# Patient Record
Sex: FEMALE | Race: WHITE | NOT HISPANIC OR LATINO | Employment: OTHER | URBAN - METROPOLITAN AREA
[De-identification: names, ages, dates, MRNs, and addresses within clinical notes are randomized per-mention and may not be internally consistent; named-entity substitution may affect disease eponyms.]

---

## 2017-10-02 ENCOUNTER — HISTORICAL (OUTPATIENT)
Dept: RADIOLOGY | Facility: HOSPITAL | Age: 82
End: 2017-10-02

## 2018-10-29 ENCOUNTER — HISTORICAL (OUTPATIENT)
Dept: RADIOLOGY | Facility: HOSPITAL | Age: 83
End: 2018-10-29

## 2019-10-31 ENCOUNTER — HISTORICAL (OUTPATIENT)
Dept: RADIOLOGY | Facility: HOSPITAL | Age: 84
End: 2019-10-31

## 2021-09-16 ENCOUNTER — HISTORICAL (OUTPATIENT)
Dept: RADIOLOGY | Facility: HOSPITAL | Age: 86
End: 2021-09-16

## 2022-04-10 ENCOUNTER — HISTORICAL (OUTPATIENT)
Dept: ADMINISTRATIVE | Facility: HOSPITAL | Age: 87
End: 2022-04-10

## 2022-04-27 VITALS
HEIGHT: 62 IN | SYSTOLIC BLOOD PRESSURE: 108 MMHG | BODY MASS INDEX: 22.08 KG/M2 | OXYGEN SATURATION: 96 % | DIASTOLIC BLOOD PRESSURE: 68 MMHG | WEIGHT: 120 LBS

## 2023-01-08 ENCOUNTER — HOSPITAL ENCOUNTER (EMERGENCY)
Facility: HOSPITAL | Age: 88
Discharge: HOME | End: 2023-01-08
Attending: INTERNAL MEDICINE
Payer: MEDICARE

## 2023-01-08 VITALS
RESPIRATION RATE: 18 BRPM | HEIGHT: 63 IN | HEART RATE: 73 BPM | OXYGEN SATURATION: 99 % | SYSTOLIC BLOOD PRESSURE: 166 MMHG | WEIGHT: 130 LBS | DIASTOLIC BLOOD PRESSURE: 90 MMHG | BODY MASS INDEX: 23.04 KG/M2

## 2023-01-08 DIAGNOSIS — R53.1 WEAKNESS: ICD-10-CM

## 2023-01-08 DIAGNOSIS — G93.31 POST VIRAL SYNDROME: Primary | ICD-10-CM

## 2023-01-08 LAB
ALBUMIN SERPL-MCNC: 3.2 G/DL (ref 3.4–4.8)
ALBUMIN/GLOB SERPL: 0.9 RATIO (ref 1.1–2)
ALP SERPL-CCNC: 48 UNIT/L (ref 40–150)
ALT SERPL-CCNC: 16 UNIT/L (ref 0–55)
APPEARANCE UR: CLEAR
AST SERPL-CCNC: 26 UNIT/L (ref 5–34)
BASOPHILS # BLD AUTO: 0.02 X10(3)/MCL (ref 0–0.2)
BASOPHILS NFR BLD AUTO: 0.6 %
BILIRUB UR QL STRIP.AUTO: NEGATIVE MG/DL
BILIRUBIN DIRECT+TOT PNL SERPL-MCNC: 0.3 MG/DL
BNP BLD-MCNC: 54.7 PG/ML
BUN SERPL-MCNC: 29 MG/DL (ref 9.8–20.1)
CALCIUM SERPL-MCNC: 10 MG/DL (ref 8.4–10.2)
CHLORIDE SERPL-SCNC: 105 MMOL/L (ref 98–107)
CO2 SERPL-SCNC: 23 MMOL/L (ref 23–31)
COLOR UR AUTO: YELLOW
CREAT SERPL-MCNC: 0.73 MG/DL (ref 0.55–1.02)
EOSINOPHIL # BLD AUTO: 0.04 X10(3)/MCL (ref 0–0.9)
EOSINOPHIL NFR BLD AUTO: 1.2 %
ERYTHROCYTE [DISTWIDTH] IN BLOOD BY AUTOMATED COUNT: 13.1 % (ref 11–14.5)
GFR SERPLBLD CREATININE-BSD FMLA CKD-EPI: 79 MLS/MIN/1.73/M2
GLOBULIN SER-MCNC: 3.5 GM/DL (ref 2.4–3.5)
GLUCOSE SERPL-MCNC: 89 MG/DL (ref 82–115)
GLUCOSE UR QL STRIP.AUTO: NEGATIVE MG/DL
HCT VFR BLD AUTO: 40.5 % (ref 37–47)
HGB BLD-MCNC: 12.6 GM/DL (ref 12–16)
IMM GRANULOCYTES # BLD AUTO: 0.01 X10(3)/MCL (ref 0–0.04)
IMM GRANULOCYTES NFR BLD AUTO: 0.3 %
KETONES UR QL STRIP.AUTO: NEGATIVE MG/DL
LACTATE SERPL-SCNC: 1 MMOL/L (ref 0.5–2.2)
LEUKOCYTE ESTERASE UR QL STRIP.AUTO: NEGATIVE UNIT/L
LYMPHOCYTES # BLD AUTO: 1.08 X10(3)/MCL (ref 0.6–4.6)
LYMPHOCYTES NFR BLD AUTO: 31.6 %
MAGNESIUM SERPL-MCNC: 2.1 MG/DL (ref 1.6–2.6)
MCH RBC QN AUTO: 30.4 PG
MCHC RBC AUTO-ENTMCNC: 31.1 MG/DL (ref 33–36)
MCV RBC AUTO: 97.6 FL (ref 80–94)
MONOCYTES # BLD AUTO: 0.54 X10(3)/MCL (ref 0.1–1.3)
MONOCYTES NFR BLD AUTO: 15.8 %
NEUTROPHILS # BLD AUTO: 1.73 X10(3)/MCL (ref 2.1–9.2)
NEUTROPHILS NFR BLD AUTO: 50.5 %
NITRITE UR QL STRIP.AUTO: NEGATIVE
PH UR STRIP.AUTO: 7 [PH]
PLATELET # BLD AUTO: 217 X10(3)/MCL (ref 140–371)
PMV BLD AUTO: 10 FL (ref 9.4–12.4)
POTASSIUM SERPL-SCNC: 4.1 MMOL/L (ref 3.5–5.1)
PROT SERPL-MCNC: 6.7 GM/DL (ref 5.8–7.6)
PROT UR QL STRIP.AUTO: NEGATIVE MG/DL
RBC # BLD AUTO: 4.15 X10(6)/MCL (ref 4.2–5.4)
RBC UR QL AUTO: NEGATIVE UNIT/L
SODIUM SERPL-SCNC: 138 MMOL/L (ref 136–145)
SP GR UR STRIP.AUTO: 1.01
TROPONIN I SERPL-MCNC: <0.01 NG/ML (ref 0–0.04)
UROBILINOGEN UR STRIP-ACNC: 0.2 MG/DL
WBC # SPEC AUTO: 3.4 X10(3)/MCL (ref 4.5–11.5)

## 2023-01-08 PROCEDURE — 83735 ASSAY OF MAGNESIUM: CPT | Performed by: INTERNAL MEDICINE

## 2023-01-08 PROCEDURE — 99284 EMERGENCY DEPT VISIT MOD MDM: CPT

## 2023-01-08 PROCEDURE — 85025 COMPLETE CBC W/AUTO DIFF WBC: CPT | Performed by: INTERNAL MEDICINE

## 2023-01-08 PROCEDURE — 81003 URINALYSIS AUTO W/O SCOPE: CPT | Performed by: INTERNAL MEDICINE

## 2023-01-08 PROCEDURE — 80053 COMPREHEN METABOLIC PANEL: CPT | Performed by: INTERNAL MEDICINE

## 2023-01-08 PROCEDURE — 83605 ASSAY OF LACTIC ACID: CPT | Performed by: INTERNAL MEDICINE

## 2023-01-08 PROCEDURE — 84484 ASSAY OF TROPONIN QUANT: CPT | Performed by: INTERNAL MEDICINE

## 2023-01-08 PROCEDURE — 83880 ASSAY OF NATRIURETIC PEPTIDE: CPT | Performed by: INTERNAL MEDICINE

## 2023-01-08 PROCEDURE — 25000003 PHARM REV CODE 250: Performed by: INTERNAL MEDICINE

## 2023-01-08 RX ORDER — SODIUM CHLORIDE 9 MG/ML
125 INJECTION, SOLUTION INTRAVENOUS ONCE
Status: COMPLETED | OUTPATIENT
Start: 2023-01-08 | End: 2023-01-08

## 2023-01-08 RX ADMIN — SODIUM CHLORIDE 125 ML/HR: 9 INJECTION, SOLUTION INTRAVENOUS at 06:01

## 2023-01-09 NOTE — ED PROVIDER NOTES
Encounter Date: 1/8/2023       History     Chief Complaint   Patient presents with    Dizziness     Dizziness x1 week, pt from [facility].      89-year-old white female presents to the emergency department with 1 week of dizziness and weakness.  Patient states she was recently diagnosed with COVID and got over it and since then she is been very weak and has occasional dizzy spells.  Currently she states when he would to hurry up because she is hungry    Review of patient's allergies indicates:   Allergen Reactions    Sulfa (sulfonamide antibiotics)      Past Medical History:   Diagnosis Date    Arthritis     Depression     GERD (gastroesophageal reflux disease)     Thyroid disease      History reviewed. No pertinent surgical history.  No family history on file.  Social History     Tobacco Use    Smoking status: Never    Smokeless tobacco: Never   Substance Use Topics    Alcohol use: Never    Drug use: Never     Review of Systems   Constitutional: Negative.  Negative for activity change, appetite change, chills, diaphoresis, fatigue, fever and unexpected weight change.   HENT: Negative.  Negative for congestion, dental problem, drooling, ear discharge, ear pain, facial swelling, hearing loss, mouth sores, nosebleeds, postnasal drip, rhinorrhea, sinus pressure, sinus pain, sneezing, sore throat, tinnitus, trouble swallowing and voice change.    Eyes: Negative.  Negative for photophobia, pain, discharge, redness, itching and visual disturbance.   Respiratory: Negative.  Negative for apnea, cough, choking, chest tightness, shortness of breath, wheezing and stridor.    Cardiovascular: Negative.  Negative for chest pain, palpitations and leg swelling.   Gastrointestinal: Negative.  Negative for abdominal distention, abdominal pain, anal bleeding, blood in stool, constipation, diarrhea, nausea, rectal pain and vomiting.   Endocrine: Negative.  Negative for cold intolerance, heat intolerance, polydipsia, polyphagia and  polyuria.   Genitourinary: Negative.  Negative for decreased urine volume, difficulty urinating, dyspareunia, dysuria, enuresis, flank pain, frequency, genital sores, hematuria, menstrual problem, pelvic pain, urgency, vaginal bleeding, vaginal discharge and vaginal pain.   Musculoskeletal: Negative.  Negative for arthralgias, back pain, gait problem, joint swelling, myalgias, neck pain and neck stiffness.   Skin: Negative.  Negative for color change, pallor, rash and wound.   Allergic/Immunologic: Negative.  Negative for environmental allergies, food allergies and immunocompromised state.   Neurological:  Positive for dizziness, weakness and light-headedness. Negative for tremors, seizures, syncope, facial asymmetry, speech difficulty, numbness and headaches.   Hematological: Negative.  Negative for adenopathy. Does not bruise/bleed easily.   Psychiatric/Behavioral: Negative.  Negative for agitation, behavioral problems, confusion, decreased concentration, dysphoric mood, hallucinations, self-injury, sleep disturbance and suicidal ideas. The patient is not nervous/anxious and is not hyperactive.    All other systems reviewed and are negative.    Physical Exam     Initial Vitals [01/08/23 1728]   BP Pulse Resp Temp SpO2   (!) 166/90 73 18 -- 99 %      MAP       --         Physical Exam    Nursing note and vitals reviewed.  Constitutional: She appears well-developed and well-nourished. She is not diaphoretic. No distress.   HENT:   Head: Normocephalic and atraumatic.   Mouth/Throat: Oropharynx is clear and moist. No oropharyngeal exudate.   Eyes: Conjunctivae and EOM are normal. Pupils are equal, round, and reactive to light. No scleral icterus.   Neck: Neck supple. No JVD present.   Normal range of motion.  Cardiovascular:  Normal rate, regular rhythm, normal heart sounds and intact distal pulses.     Exam reveals no gallop and no friction rub.       No murmur heard.  Pulmonary/Chest: Breath sounds normal. No  respiratory distress. She has no wheezes. She exhibits no tenderness.   Abdominal: Abdomen is soft. Bowel sounds are normal. She exhibits no distension. There is no abdominal tenderness. There is no rebound.   Musculoskeletal:         General: Normal range of motion.      Cervical back: Normal range of motion and neck supple.     Neurological: She is alert and oriented to person, place, and time. She has normal strength.   Skin: Skin is warm and dry. Capillary refill takes less than 2 seconds.   Psychiatric: She has a normal mood and affect. Her behavior is normal. Judgment and thought content normal.       ED Course   Procedures  Admission on 01/08/2023   Component Date Value Ref Range Status    Sodium Level 01/08/2023 138  136 - 145 mmol/L Final    Potassium Level 01/08/2023 4.1  3.5 - 5.1 mmol/L Final    Chloride 01/08/2023 105  98 - 107 mmol/L Final    Carbon Dioxide 01/08/2023 23  23 - 31 mmol/L Final    Glucose Level 01/08/2023 89  82 - 115 mg/dL Final    Blood Urea Nitrogen 01/08/2023 29.0 (H)  9.8 - 20.1 mg/dL Final    Creatinine 01/08/2023 0.73  0.55 - 1.02 mg/dL Final    Calcium Level Total 01/08/2023 10.0  8.4 - 10.2 mg/dL Final    Protein Total 01/08/2023 6.7  5.8 - 7.6 gm/dL Final    Albumin Level 01/08/2023 3.2 (L)  3.4 - 4.8 g/dL Final    Globulin 01/08/2023 3.5  2.4 - 3.5 gm/dL Final    Albumin/Globulin Ratio 01/08/2023 0.9 (L)  1.1 - 2.0 ratio Final    Bilirubin Total 01/08/2023 0.3  <=1.5 mg/dL Final    Alkaline Phosphatase 01/08/2023 48  40 - 150 unit/L Final    Alanine Aminotransferase 01/08/2023 16  0 - 55 unit/L Final    Aspartate Aminotransferase 01/08/2023 26  5 - 34 unit/L Final    eGFR 01/08/2023 79  mls/min/1.73/m2 Final    Color, UA 01/08/2023 Yellow  Yellow, Light-Yellow, Dark Yellow, Enriqueta, Straw Final    Appearance, UA 01/08/2023 Clear  Clear Final    Specific Gravity, UA 01/08/2023 1.015   Final    pH, UA 01/08/2023 7.0  5.0 - 8.5 Final    Protein, UA 01/08/2023 Negative  Negative  mg/dL Final    Glucose, UA 01/08/2023 Negative  Negative, Normal mg/dL Final    Ketones, UA 01/08/2023 Negative  Negative mg/dL Final    Blood, UA 01/08/2023 Negative  Negative unit/L Final    Bilirubin, UA 01/08/2023 Negative  Negative mg/dL Final    Urobilinogen, UA 01/08/2023 0.2  0.2, 1.0, Normal mg/dL Final    Nitrites, UA 01/08/2023 Negative  Negative Final    Leukocyte Esterase, UA 01/08/2023 Negative  Negative unit/L Final    Lactic Acid Level 01/08/2023 1.0  0.5 - 2.2 mmol/L Final    Troponin-I 01/08/2023 <0.010  0.000 - 0.045 ng/mL Final    Natriuretic Peptide 01/08/2023 54.7  <=100.0 pg/mL Final    Magnesium Level 01/08/2023 2.10  1.60 - 2.60 mg/dL Final    WBC 01/08/2023 3.4 (L)  4.5 - 11.5 x10(3)/mcL Final    RBC 01/08/2023 4.15 (L)  4.20 - 5.40 x10(6)/mcL Final    Hgb 01/08/2023 12.6  12.0 - 16.0 gm/dL Final    Hct 01/08/2023 40.5  37.0 - 47.0 % Final    MCV 01/08/2023 97.6 (H)  80.0 - 94.0 fL Final    MCH 01/08/2023 30.4  pg Final    MCHC 01/08/2023 31.1 (L)  33.0 - 36.0 mg/dL Final    RDW 01/08/2023 13.1  11.0 - 14.5 % Final    Platelet 01/08/2023 217  140 - 371 x10(3)/mcL Final    MPV 01/08/2023 10.0  9.4 - 12.4 fL Final    Neut % 01/08/2023 50.5  % Final    Lymph % 01/08/2023 31.6  % Final    Mono % 01/08/2023 15.8  % Final    Eos % 01/08/2023 1.2  % Final    Basophil % 01/08/2023 0.6  % Final    Lymph # 01/08/2023 1.08  0.6 - 4.6 x10(3)/mcL Final    Neut # 01/08/2023 1.73 (L)  2.1 - 9.2 x10(3)/mcL Final    Mono # 01/08/2023 0.54  0.1 - 1.3 x10(3)/mcL Final    Eos # 01/08/2023 0.04  0 - 0.9 x10(3)/mcL Final    Baso # 01/08/2023 0.02  0 - 0.2 x10(3)/mcL Final    IG# 01/08/2023 0.01  0 - 0.04 x10(3)/mcL Final    IG% 01/08/2023 0.3  % Final       Labs Reviewed   COMPREHENSIVE METABOLIC PANEL - Abnormal; Notable for the following components:       Result Value    Blood Urea Nitrogen 29.0 (*)     Albumin Level 3.2 (*)     Albumin/Globulin Ratio 0.9 (*)     All other components within normal limits   CBC  WITH DIFFERENTIAL - Abnormal; Notable for the following components:    WBC 3.4 (*)     RBC 4.15 (*)     MCV 97.6 (*)     MCHC 31.1 (*)     Neut # 1.73 (*)     All other components within normal limits   LACTIC ACID, PLASMA - Normal   TROPONIN I - Normal   B-TYPE NATRIURETIC PEPTIDE - Normal   MAGNESIUM - Normal   CBC W/ AUTO DIFFERENTIAL    Narrative:     The following orders were created for panel order CBC auto differential.  Procedure                               Abnormality         Status                     ---------                               -----------         ------                     CBC with Differential[[id number]]        Abnormal            Final result                 Please view results for these tests on the individual orders.   URINALYSIS, REFLEX TO URINE CULTURE    Narrative:      URINE STABILITY IS 2 HOURS AT ROOM TEMP OR    SIX HOURS REFRIGERATED. PERFORMING TESTING ON    SPECIMENS GREATER THAN THIS AGE MAY AFFECT THE    FOLLOWING TESTS:    PH          SPECIFIC GRAVITY           BLOOD    CLARITY     BILIRUBIN               UROBILINOGEN          Imaging Results    None          Medications   0.9%  NaCl infusion (125 mL/hr Intravenous New Bag 1/8/23 8779)                       Medical Decision Making  [age] year old white female presents emergency department with 1 week of weakness and intermittent dizziness.  Patient denies dizziness currently but states that she just feels weak.  She was recently diagnosed with COVID and got over it and she states since then she continues to be weak.  We discussed that she apparently has post viral fatigue syndrome as a workup was done which shows a normal white blood cell count and H&H mild elevation in BUN and creatinine is normal and the rest of her blood work is normal and urine is clear.  Her blood pressure stable vitals are stable and she is nontoxic looking.  We discussed expectations and she can follow up with her primary care physician upon  discharge if she continues with his fatigue    Problems Addressed:  Post viral syndrome: self-limited or minor problem  Weakness: self-limited or minor problem    Amount and/or Complexity of Data Reviewed  Independent Historian: caregiver  External Data Reviewed: labs and notes.  Labs: ordered. Decision-making details documented in ED Course.    Risk  OTC drugs.  Decision regarding hospitalization.            Clinical Impression:   Final diagnoses:  [G93.31] Post viral syndrome (Primary)  [R53.1] Weakness        ED Disposition Condition    Discharge Stable          ED Prescriptions    None       Follow-up Information       Follow up With Specialties Details Why Contact Info    Virgil Mcknight MD Internal Medicine In 2 days  [facility] [facility] [address]  [facility] [address], [facility] [address]  [phone]            Cecelia De Jesus is a certified MA and was present during the entire interaction with this patient      Zachariah Spears MD  01/08/23 2014

## 2023-06-04 ENCOUNTER — HOSPITAL ENCOUNTER (EMERGENCY)
Facility: HOSPITAL | Age: 88
Discharge: HOME | End: 2023-06-04
Attending: FAMILY MEDICINE
Payer: MEDICARE

## 2023-06-04 VITALS
RESPIRATION RATE: 16 BRPM | DIASTOLIC BLOOD PRESSURE: 104 MMHG | BODY MASS INDEX: 23.03 KG/M2 | HEART RATE: 59 BPM | WEIGHT: 130 LBS | TEMPERATURE: 98 F | OXYGEN SATURATION: 100 % | SYSTOLIC BLOOD PRESSURE: 159 MMHG

## 2023-06-04 DIAGNOSIS — S70.12XA CONTUSION OF LEFT THIGH, INITIAL ENCOUNTER: Primary | ICD-10-CM

## 2023-06-04 DIAGNOSIS — S01.01XA LACERATION OF SKIN OF SCALP, INITIAL ENCOUNTER: ICD-10-CM

## 2023-06-04 PROCEDURE — 99285 EMERGENCY DEPT VISIT HI MDM: CPT | Mod: 25

## 2023-06-04 NOTE — ED PROVIDER NOTES
Encounter Date: 6/4/2023       History     Chief Complaint   Patient presents with    Fall     C/o tripped over husbands wheelchair and fell, denies LOC. Pt c/o pain to left ear and feeling tired.     Patient is an 89-year-old female from the assisted living brought in the emergency room by ambulance due to a fall.  Patient reports that she was walking this morning, and tripped over a friend wheelchair causing her to fall.  Patient landed on her left thigh, and also hit her left head.  No loss of consciousness.  No dizziness.  No nausea or vomiting.  Patient has a superficial abrasion to left forearm.  Due to the fall, EMS was called.  Patient was ambulatory on scene.  Patient reports simply tripping, feels in his normal state of health.  Has been eating and drinking well.  Patient denies chest pain shortness breath nausea vomiting or abdominal pain.    The history is provided by the patient.   Review of patient's allergies indicates:   Allergen Reactions    Sulfa (sulfonamide antibiotics)      Past Medical History:   Diagnosis Date    Anxiety disorder, unspecified     Arthritis     Depression     GERD (gastroesophageal reflux disease)     Hypertension     Thyroid disease      History reviewed. No pertinent surgical history.  History reviewed. No pertinent family history.  Social History     Tobacco Use    Smoking status: Never    Smokeless tobacco: Never   Substance Use Topics    Alcohol use: Never    Drug use: Never     Review of Systems   Constitutional:  Negative for chills, fatigue and fever.   HENT:  Negative for ear pain, rhinorrhea and sore throat.    Eyes:  Negative for photophobia and pain.   Respiratory:  Negative for cough, shortness of breath and wheezing.    Cardiovascular:  Negative for chest pain.   Gastrointestinal:  Negative for abdominal pain, diarrhea, nausea and vomiting.   Genitourinary:  Negative for dysuria.   Neurological:  Negative for dizziness, weakness and headaches.   All other systems  reviewed and are negative.    Physical Exam     Initial Vitals [06/04/23 1014]   BP Pulse Resp Temp SpO2   (!) 148/69 68 18 97.9 °F (36.6 °C) 98 %      MAP       --         Physical Exam    Nursing note and vitals reviewed.  Constitutional: She appears well-developed and well-nourished. No distress.   HENT:   Head: Normocephalic.   Right Ear: External ear normal.   Left Ear: External ear normal.   Mouth/Throat: Oropharynx is clear and moist. No oropharyngeal exudate.   No soft tissue swelling of the scalp.  Patient has a 1 cm superficial laceration at the hairline on the left parietal region.  No active bleeding.  No tenderness to palpation.   Eyes: Conjunctivae and EOM are normal. Pupils are equal, round, and reactive to light.   Neck: Neck supple.   Normal range of motion.  Cardiovascular:  Normal rate, regular rhythm, normal heart sounds and intact distal pulses.           Pulmonary/Chest: Breath sounds normal. No respiratory distress. She has no wheezes. She has no rhonchi. She has no rales.   Abdominal: Abdomen is soft. Bowel sounds are normal. She exhibits no distension. There is no abdominal tenderness. There is no rebound and no guarding.   Musculoskeletal:         General: Normal range of motion.      Cervical back: Normal range of motion and neck supple.      Comments: Full range of motion of both hips without pain or restriction.     Neurological: She is alert and oriented to person, place, and time.   Skin: Skin is warm and dry. Capillary refill takes less than 2 seconds. No erythema.   3 cm x 3 cm superficial abrasion to the left posterior medial forearm consistent with a skin tear.  No active bleeding.   Psychiatric: She has a normal mood and affect. Her behavior is normal. Judgment and thought content normal.       ED Course   Procedures  Labs Reviewed - No data to display       Imaging Results              CT Head Without Contrast (Final result)  Result time 06/04/23 10:53:30      Final result by  Lonny Méndez MD (06/04/23 10:53:30)                   Impression:      1. No evidence of acute intracranial injury.      Electronically signed by: Lonny Méndez MD  Date:    06/04/2023  Time:    10:53               Narrative:    EXAMINATION:  CT HEAD WITHOUT CONTRAST    INDICATION:  head injury;    TECHNIQUE:  Contiguous axial images are acquired through the head without IV contrast.  These images were reconstructed into the coronal and sagittal plane.  Automated exposure control, dose radiation lowering technique was utilized.    COMPARISON:  Head CT from 02/02/2021    FINDINGS:  Partially visualized paranasal sinuses and the mastoid air cells are clear.  Intact calvarium.  No extra-axial fluid collection, contusion or hemorrhage.  Mild, diffuse atrophy is present.  Gray-white differentiation is relatively well preserved.                                       X-Ray Hip 2 or 3 views Left (with Pelvis when performed) (Final result)  Result time 06/04/23 10:50:43      Final result by Lonny Méndez MD (06/04/23 10:50:43)                   Impression:      1. No evidence of acute injury to the pelvis or left hip.  2. Mild bilateral hip osteoarthrosis.      Electronically signed by: Lonny Méndez MD  Date:    06/04/2023  Time:    10:50               Narrative:    EXAMINATION:  XR HIP WITH PELVIS WHEN PERFORMED, 2 OR 3 VIEWS LEFT    INDICATION:  left hip pain;    COMPARISON:  None    FINDINGS:  AP view of the pelvis obtained with AP and oblique views of the left hip.  Bone mineralization is normal.  No fracture.  Alignment is anatomic.  There are mild degenerative changes of both hips.  Intact pubic symphysis.  SI joints appear intact.                                       Medications - No data to display  Medical Decision Making:   Initial Assessment:   Patient is an 89-year-old female presents emergency room for evaluation due to a fall secondary to tripping.  Patient feels normal state of health  otherwise.  Superficial laceration to the left scalp, abrasion to left forearm, and reports of a fall on left side with mild pain in her left thigh.  Will obtain x-ray left hip for evaluation.  Also obtain a CT scan of the head due to the fall with head injury.  Patient is highly active, reports walking up and down the halls 5 times, performing this action 3 times a day.           ED Course as of 06/08/23 0058   Sun Jun 04, 2023   1110 No acute abnormality appreciated; patient walking with a normal gait.  Stable for discharge to home.  Reassurance given to patient. [MW]      ED Course User Index  [MW] Natalio De La Torre MD                 Clinical Impression:   Final diagnoses:  [S70.12XA] Contusion of left thigh, initial encounter (Primary)  [S01.01XA] Laceration of skin of scalp, initial encounter        ED Disposition Condition    Discharge Stable          ED Prescriptions    None       Follow-up Information       Follow up With Specialties Details Why Contact Info    [facility] - Emergency Dept Emergency Medicine  As needed, If symptoms worsen [id number]  [facility] [id number] [phone]    Primary Care Physician  In 5 days               Natalio De La Torre MD  06/08/23 0058

## 2023-06-12 DIAGNOSIS — Z12.31 ENCOUNTER FOR SCREENING MAMMOGRAM FOR BREAST CANCER: Primary | ICD-10-CM

## 2023-06-12 DIAGNOSIS — M81.0 OP (OSTEOPOROSIS): ICD-10-CM

## 2023-06-29 ENCOUNTER — HOSPITAL ENCOUNTER (OUTPATIENT)
Dept: RADIOLOGY | Facility: HOSPITAL | Age: 88
Discharge: HOME | End: 2023-06-29
Attending: INTERNAL MEDICINE
Payer: MEDICARE

## 2023-06-29 DIAGNOSIS — Z12.31 ENCOUNTER FOR SCREENING MAMMOGRAM FOR BREAST CANCER: ICD-10-CM

## 2023-06-29 DIAGNOSIS — M81.0 OP (OSTEOPOROSIS): ICD-10-CM

## 2023-06-29 PROCEDURE — 77063 BREAST TOMOSYNTHESIS BI: CPT | Mod: 26,,, | Performed by: STUDENT IN AN ORGANIZED HEALTH CARE EDUCATION/TRAINING PROGRAM

## 2023-06-29 PROCEDURE — 77080 DXA BONE DENSITY AXIAL: CPT | Mod: TC

## 2023-06-29 PROCEDURE — 77067 SCR MAMMO BI INCL CAD: CPT | Mod: TC

## 2023-06-29 PROCEDURE — 77067 MAMMO DIGITAL SCREENING BILAT WITH TOMO: ICD-10-PCS | Mod: 26,,, | Performed by: STUDENT IN AN ORGANIZED HEALTH CARE EDUCATION/TRAINING PROGRAM

## 2023-06-29 PROCEDURE — 77067 SCR MAMMO BI INCL CAD: CPT | Mod: 26,,, | Performed by: STUDENT IN AN ORGANIZED HEALTH CARE EDUCATION/TRAINING PROGRAM

## 2023-06-29 PROCEDURE — 77063 MAMMO DIGITAL SCREENING BILAT WITH TOMO: ICD-10-PCS | Mod: 26,,, | Performed by: STUDENT IN AN ORGANIZED HEALTH CARE EDUCATION/TRAINING PROGRAM

## 2023-09-09 ENCOUNTER — HOSPITAL ENCOUNTER (INPATIENT)
Facility: HOSPITAL | Age: 88
LOS: 5 days | Discharge: HOME HEALTH | DRG: 176 | End: 2023-09-14
Attending: EMERGENCY MEDICINE | Admitting: INTERNAL MEDICINE
Payer: MEDICARE

## 2023-09-09 DIAGNOSIS — I10 MALIGNANT HYPERTENSION: ICD-10-CM

## 2023-09-09 DIAGNOSIS — R07.9 CHEST PAIN: ICD-10-CM

## 2023-09-09 DIAGNOSIS — I26.99 PULMONARY EMBOLUS: ICD-10-CM

## 2023-09-09 DIAGNOSIS — I26.99 PULMONARY EMBOLISM: ICD-10-CM

## 2023-09-09 DIAGNOSIS — I26.99 PULMONARY EMBOLISM, UNSPECIFIED CHRONICITY, UNSPECIFIED PULMONARY EMBOLISM TYPE, UNSPECIFIED WHETHER ACUTE COR PULMONALE PRESENT: Primary | ICD-10-CM

## 2023-09-09 LAB
ALBUMIN SERPL-MCNC: 3.6 G/DL (ref 3.4–4.8)
ALBUMIN/GLOB SERPL: 1 RATIO (ref 1.1–2)
ALP SERPL-CCNC: 43 UNIT/L (ref 40–150)
ALT SERPL-CCNC: 17 UNIT/L (ref 0–55)
AST SERPL-CCNC: 29 UNIT/L (ref 5–34)
BASOPHILS # BLD AUTO: 0.02 X10(3)/MCL
BASOPHILS NFR BLD AUTO: 0.5 %
BILIRUB SERPL-MCNC: 0.3 MG/DL
BUN SERPL-MCNC: 24 MG/DL (ref 9.8–20.1)
CALCIUM SERPL-MCNC: 10.2 MG/DL (ref 8.4–10.2)
CHLORIDE SERPL-SCNC: 101 MMOL/L (ref 98–111)
CK MB SERPL-MCNC: 3 NG/ML
CK SERPL-CCNC: 81 U/L (ref 29–168)
CO2 SERPL-SCNC: 27 MMOL/L (ref 23–31)
CREAT SERPL-MCNC: 0.83 MG/DL (ref 0.55–1.02)
EOSINOPHIL # BLD AUTO: 0.11 X10(3)/MCL (ref 0–0.9)
EOSINOPHIL NFR BLD AUTO: 2.8 %
ERYTHROCYTE [DISTWIDTH] IN BLOOD BY AUTOMATED COUNT: 12.9 % (ref 11.5–17)
GFR SERPLBLD CREATININE-BSD FMLA CKD-EPI: >60 MLS/MIN/1.73/M2
GLOBULIN SER-MCNC: 3.7 GM/DL (ref 2.4–3.5)
GLUCOSE SERPL-MCNC: 82 MG/DL (ref 75–121)
HCT VFR BLD AUTO: 41.5 % (ref 37–47)
HGB BLD-MCNC: 13.3 G/DL (ref 12–16)
IMM GRANULOCYTES # BLD AUTO: 0.01 X10(3)/MCL (ref 0–0.04)
IMM GRANULOCYTES NFR BLD AUTO: 0.3 %
INR PPP: 1.1
LIPASE SERPL-CCNC: 34 U/L
LYMPHOCYTES # BLD AUTO: 1.12 X10(3)/MCL (ref 0.6–4.6)
LYMPHOCYTES NFR BLD AUTO: 28.4 %
MAGNESIUM SERPL-MCNC: 2 MG/DL (ref 1.6–2.6)
MCH RBC QN AUTO: 30.2 PG (ref 27–31)
MCHC RBC AUTO-ENTMCNC: 32 G/DL (ref 33–36)
MCV RBC AUTO: 94.1 FL (ref 80–94)
MONOCYTES # BLD AUTO: 0.49 X10(3)/MCL (ref 0.1–1.3)
MONOCYTES NFR BLD AUTO: 12.4 %
NEUTROPHILS # BLD AUTO: 2.19 X10(3)/MCL (ref 2.1–9.2)
NEUTROPHILS NFR BLD AUTO: 55.6 %
PLATELET # BLD AUTO: 255 X10(3)/MCL (ref 130–400)
PMV BLD AUTO: 9.9 FL (ref 7.4–10.4)
POTASSIUM SERPL-SCNC: 3.9 MMOL/L (ref 3.5–5.1)
PROT SERPL-MCNC: 7.3 GM/DL (ref 5.8–7.6)
PROTHROMBIN TIME: 14 SECONDS (ref 12.5–14.5)
RBC # BLD AUTO: 4.41 X10(6)/MCL (ref 4.2–5.4)
SODIUM SERPL-SCNC: 138 MMOL/L (ref 132–146)
TROPONIN I SERPL-MCNC: 0.02 NG/ML (ref 0–0.04)
WBC # SPEC AUTO: 3.94 X10(3)/MCL (ref 4.5–11.5)

## 2023-09-09 PROCEDURE — 96374 THER/PROPH/DIAG INJ IV PUSH: CPT

## 2023-09-09 PROCEDURE — 83690 ASSAY OF LIPASE: CPT | Performed by: EMERGENCY MEDICINE

## 2023-09-09 PROCEDURE — 82553 CREATINE MB FRACTION: CPT | Performed by: EMERGENCY MEDICINE

## 2023-09-09 PROCEDURE — 25500020 PHARM REV CODE 255: Performed by: EMERGENCY MEDICINE

## 2023-09-09 PROCEDURE — 93005 ELECTROCARDIOGRAM TRACING: CPT

## 2023-09-09 PROCEDURE — 63600175 PHARM REV CODE 636 W HCPCS: Performed by: GENERAL ACUTE CARE HOSPITAL

## 2023-09-09 PROCEDURE — 21400001 HC TELEMETRY ROOM

## 2023-09-09 PROCEDURE — 25000003 PHARM REV CODE 250: Performed by: EMERGENCY MEDICINE

## 2023-09-09 PROCEDURE — 82550 ASSAY OF CK (CPK): CPT | Performed by: EMERGENCY MEDICINE

## 2023-09-09 PROCEDURE — 85025 COMPLETE CBC W/AUTO DIFF WBC: CPT | Performed by: EMERGENCY MEDICINE

## 2023-09-09 PROCEDURE — 80053 COMPREHEN METABOLIC PANEL: CPT | Performed by: EMERGENCY MEDICINE

## 2023-09-09 PROCEDURE — 63600175 PHARM REV CODE 636 W HCPCS: Performed by: INTERNAL MEDICINE

## 2023-09-09 PROCEDURE — 11000001 HC ACUTE MED/SURG PRIVATE ROOM

## 2023-09-09 PROCEDURE — 25000003 PHARM REV CODE 250: Performed by: INTERNAL MEDICINE

## 2023-09-09 PROCEDURE — 94761 N-INVAS EAR/PLS OXIMETRY MLT: CPT

## 2023-09-09 PROCEDURE — 99291 CRITICAL CARE FIRST HOUR: CPT

## 2023-09-09 PROCEDURE — 84484 ASSAY OF TROPONIN QUANT: CPT | Performed by: EMERGENCY MEDICINE

## 2023-09-09 PROCEDURE — 25000003 PHARM REV CODE 250: Performed by: GENERAL ACUTE CARE HOSPITAL

## 2023-09-09 PROCEDURE — 93010 ELECTROCARDIOGRAM REPORT: CPT | Mod: 76,,, | Performed by: INTERNAL MEDICINE

## 2023-09-09 PROCEDURE — 93010 EKG 12-LEAD: ICD-10-PCS | Mod: 76,,, | Performed by: INTERNAL MEDICINE

## 2023-09-09 PROCEDURE — 83735 ASSAY OF MAGNESIUM: CPT | Performed by: EMERGENCY MEDICINE

## 2023-09-09 PROCEDURE — 85610 PROTHROMBIN TIME: CPT | Performed by: EMERGENCY MEDICINE

## 2023-09-09 RX ORDER — COLESEVELAM 180 1/1
625 TABLET ORAL
Status: DISCONTINUED | OUTPATIENT
Start: 2023-09-10 | End: 2023-09-10

## 2023-09-09 RX ORDER — MECLIZINE HCL 12.5 MG 12.5 MG/1
12.5 TABLET ORAL EVERY 12 HOURS PRN
Status: DISCONTINUED | OUTPATIENT
Start: 2023-09-09 | End: 2023-09-14 | Stop reason: HOSPADM

## 2023-09-09 RX ORDER — FOLIC ACID 1 MG/1
1 TABLET ORAL DAILY
COMMUNITY

## 2023-09-09 RX ORDER — DOCUSATE SODIUM 100 MG/1
100 CAPSULE, LIQUID FILLED ORAL NIGHTLY
Status: DISCONTINUED | OUTPATIENT
Start: 2023-09-09 | End: 2023-09-14 | Stop reason: HOSPADM

## 2023-09-09 RX ORDER — HEPARIN SODIUM,PORCINE/D5W 25000/250
10 INTRAVENOUS SOLUTION INTRAVENOUS CONTINUOUS
Status: DISCONTINUED | OUTPATIENT
Start: 2023-09-09 | End: 2023-09-13

## 2023-09-09 RX ORDER — VENLAFAXINE HYDROCHLORIDE 150 MG/1
150 CAPSULE, EXTENDED RELEASE ORAL DAILY
COMMUNITY
Start: 2023-09-01

## 2023-09-09 RX ORDER — AMOXICILLIN 500 MG
1 CAPSULE ORAL 3 TIMES DAILY
Status: DISCONTINUED | OUTPATIENT
Start: 2023-09-10 | End: 2023-09-10

## 2023-09-09 RX ORDER — SODIUM CHLORIDE 0.9 % (FLUSH) 0.9 %
10 SYRINGE (ML) INJECTION
Status: DISCONTINUED | OUTPATIENT
Start: 2023-09-09 | End: 2023-09-14 | Stop reason: HOSPADM

## 2023-09-09 RX ORDER — FERROUS SULFATE, DRIED 160(50) MG
1 TABLET, EXTENDED RELEASE ORAL 2 TIMES DAILY WITH MEALS
Status: DISCONTINUED | OUTPATIENT
Start: 2023-09-10 | End: 2023-09-14 | Stop reason: HOSPADM

## 2023-09-09 RX ORDER — ARIPIPRAZOLE 5 MG/1
5 TABLET ORAL NIGHTLY
COMMUNITY
Start: 2023-09-01

## 2023-09-09 RX ORDER — DOCUSATE SODIUM 100 MG/1
200 CAPSULE, LIQUID FILLED ORAL DAILY
Status: DISCONTINUED | OUTPATIENT
Start: 2023-09-10 | End: 2023-09-14 | Stop reason: HOSPADM

## 2023-09-09 RX ORDER — AMLODIPINE BESYLATE 5 MG/1
5 TABLET ORAL DAILY
Status: DISCONTINUED | OUTPATIENT
Start: 2023-09-10 | End: 2023-09-14 | Stop reason: HOSPADM

## 2023-09-09 RX ORDER — HYDRALAZINE HYDROCHLORIDE 20 MG/ML
10 INJECTION INTRAMUSCULAR; INTRAVENOUS
Status: ACTIVE | OUTPATIENT
Start: 2023-09-09 | End: 2023-09-10

## 2023-09-09 RX ORDER — DOCUSATE SODIUM 100 MG/1
100 CAPSULE, LIQUID FILLED ORAL NIGHTLY
COMMUNITY

## 2023-09-09 RX ORDER — FOLIC ACID 1 MG/1
1 TABLET ORAL DAILY
Status: DISCONTINUED | OUTPATIENT
Start: 2023-09-10 | End: 2023-09-14 | Stop reason: HOSPADM

## 2023-09-09 RX ORDER — HYDRALAZINE HYDROCHLORIDE 20 MG/ML
10 INJECTION INTRAMUSCULAR; INTRAVENOUS EVERY 8 HOURS PRN
Status: DISCONTINUED | OUTPATIENT
Start: 2023-09-09 | End: 2023-09-14 | Stop reason: HOSPADM

## 2023-09-09 RX ORDER — CYANOCOBALAMIN (VITAMIN B-12) 250 MCG
250 TABLET ORAL DAILY
Status: DISCONTINUED | OUTPATIENT
Start: 2023-09-10 | End: 2023-09-10

## 2023-09-09 RX ORDER — ASPIRIN 81 MG/1
81 TABLET ORAL DAILY
COMMUNITY

## 2023-09-09 RX ORDER — IRON,CARB/VIT C/VIT B12/FOLIC 100-250-1
1 TABLET ORAL
COMMUNITY

## 2023-09-09 RX ORDER — CLONAZEPAM 0.5 MG/1
0.5 TABLET ORAL NIGHTLY
Status: DISCONTINUED | OUTPATIENT
Start: 2023-09-09 | End: 2023-09-14 | Stop reason: HOSPADM

## 2023-09-09 RX ORDER — FENOFIBRATE 145 MG/1
145 TABLET, FILM COATED ORAL NIGHTLY
Status: DISCONTINUED | OUTPATIENT
Start: 2023-09-09 | End: 2023-09-14 | Stop reason: HOSPADM

## 2023-09-09 RX ORDER — COLESEVELAM 180 1/1
625 TABLET ORAL
COMMUNITY
Start: 2023-09-01

## 2023-09-09 RX ORDER — AMLODIPINE BESYLATE 5 MG/1
5 TABLET ORAL
Status: COMPLETED | OUTPATIENT
Start: 2023-09-09 | End: 2023-09-09

## 2023-09-09 RX ORDER — GLUCOSAMINE/CHONDRO SU A 500-400 MG
1 TABLET ORAL 2 TIMES DAILY
COMMUNITY

## 2023-09-09 RX ORDER — ARIPIPRAZOLE 5 MG/1
5 TABLET ORAL NIGHTLY
Status: DISCONTINUED | OUTPATIENT
Start: 2023-09-09 | End: 2023-09-14 | Stop reason: HOSPADM

## 2023-09-09 RX ORDER — MECLIZINE HYDROCHLORIDE 50 MG/1
12.5 TABLET ORAL EVERY 12 HOURS PRN
COMMUNITY

## 2023-09-09 RX ORDER — FERROUS SULFATE, DRIED 160(50) MG
1 TABLET, EXTENDED RELEASE ORAL 2 TIMES DAILY WITH MEALS
COMMUNITY

## 2023-09-09 RX ORDER — CLONAZEPAM 0.5 MG/1
0.25 TABLET ORAL NIGHTLY
COMMUNITY
Start: 2023-08-22

## 2023-09-09 RX ORDER — FENOFIBRATE 145 MG/1
145 TABLET, FILM COATED ORAL NIGHTLY
COMMUNITY
Start: 2023-09-01

## 2023-09-09 RX ORDER — CYANOCOBALAMIN (VITAMIN B-12) 250 MCG
250 TABLET ORAL DAILY
COMMUNITY

## 2023-09-09 RX ORDER — ASPIRIN 81 MG/1
81 TABLET ORAL DAILY
Status: DISCONTINUED | OUTPATIENT
Start: 2023-09-10 | End: 2023-09-14 | Stop reason: HOSPADM

## 2023-09-09 RX ORDER — MULTIVITAMIN
1 TABLET ORAL DAILY
COMMUNITY

## 2023-09-09 RX ORDER — PANTOPRAZOLE SODIUM 40 MG/1
40 TABLET, DELAYED RELEASE ORAL 2 TIMES DAILY
COMMUNITY
Start: 2023-09-01

## 2023-09-09 RX ORDER — TALC
6 POWDER (GRAM) TOPICAL NIGHTLY PRN
Status: DISCONTINUED | OUTPATIENT
Start: 2023-09-09 | End: 2023-09-14 | Stop reason: HOSPADM

## 2023-09-09 RX ORDER — AMLODIPINE BESYLATE 5 MG/1
5 TABLET ORAL DAILY
COMMUNITY
Start: 2023-09-01

## 2023-09-09 RX ORDER — GLUCOSAMINE/CHONDRO SU A 500-400 MG
1 TABLET ORAL 2 TIMES DAILY
Status: DISCONTINUED | OUTPATIENT
Start: 2023-09-09 | End: 2023-09-10

## 2023-09-09 RX ORDER — AMOXICILLIN 500 MG
1 CAPSULE ORAL 3 TIMES DAILY
COMMUNITY

## 2023-09-09 RX ORDER — PANTOPRAZOLE SODIUM 40 MG/1
40 TABLET, DELAYED RELEASE ORAL 2 TIMES DAILY
Status: DISCONTINUED | OUTPATIENT
Start: 2023-09-09 | End: 2023-09-14 | Stop reason: HOSPADM

## 2023-09-09 RX ORDER — DOCUSATE SODIUM 100 MG/1
200 CAPSULE, LIQUID FILLED ORAL DAILY
COMMUNITY

## 2023-09-09 RX ORDER — ERGOCALCIFEROL 1.25 MG/1
50000 CAPSULE ORAL
Status: DISCONTINUED | OUTPATIENT
Start: 2023-09-10 | End: 2023-09-14 | Stop reason: HOSPADM

## 2023-09-09 RX ORDER — ERGOCALCIFEROL 1.25 MG/1
50000 CAPSULE ORAL
COMMUNITY

## 2023-09-09 RX ORDER — VENLAFAXINE HYDROCHLORIDE 150 MG/1
150 CAPSULE, EXTENDED RELEASE ORAL DAILY
Status: DISCONTINUED | OUTPATIENT
Start: 2023-09-10 | End: 2023-09-14 | Stop reason: HOSPADM

## 2023-09-09 RX ADMIN — HEPARIN SODIUM 18 UNITS/KG/HR: 10000 INJECTION, SOLUTION INTRAVENOUS at 07:09

## 2023-09-09 RX ADMIN — HYDRALAZINE HYDROCHLORIDE 10 MG: 20 INJECTION, SOLUTION INTRAMUSCULAR; INTRAVENOUS at 11:09

## 2023-09-09 RX ADMIN — ARIPIPRAZOLE 5 MG: 5 TABLET ORAL at 11:09

## 2023-09-09 RX ADMIN — DOCUSATE SODIUM 100 MG: 100 CAPSULE, LIQUID FILLED ORAL at 11:09

## 2023-09-09 RX ADMIN — PANTOPRAZOLE SODIUM 40 MG: 40 TABLET, DELAYED RELEASE ORAL at 11:09

## 2023-09-09 RX ADMIN — FENOFIBRATE 145 MG: 145 TABLET, COATED ORAL at 11:09

## 2023-09-09 RX ADMIN — IOPAMIDOL 100 ML: 755 INJECTION, SOLUTION INTRAVENOUS at 05:09

## 2023-09-09 RX ADMIN — CLONAZEPAM 0.5 MG: 0.5 TABLET ORAL at 11:09

## 2023-09-09 RX ADMIN — NITROGLYCERIN 1 INCH: 20 OINTMENT TOPICAL at 06:09

## 2023-09-09 RX ADMIN — AMLODIPINE BESYLATE 5 MG: 5 TABLET ORAL at 05:09

## 2023-09-09 NOTE — ED PROVIDER NOTES
Encounter Date: 9/9/2023       History     Chief Complaint   Patient presents with    Chest Pain     BIBA from NH for CP  EMS gave 324 asa and 1 SL nitro     The patient is a 89-year-old female with a history of hypertension, hyperlipidemia, anxiety, hypothyroidism, GERD, depression, who presents to the emergency department via EMS from [facility] with chest pain.  Patient states that she was just sitting resting when she began to have anterior chest pain radiating through to her back associated with some shortness of breath.  Patient was given 325 of aspirin and 1 sublingual nitroglycerin with resolution of her symptoms.      Chest Pain  The current episode started just prior to arrival. Chest pain occurs intermittently. The chest pain is resolved. The quality of the pain is described as aching. The pain radiates to the mid back. Primary symptoms include shortness of breath and nausea. Pertinent negatives for primary symptoms include no fever, no fatigue, no syncope, no cough, no wheezing, no palpitations, no abdominal pain, no vomiting, no dizziness and no altered mental status.   The shortness of breath began today. The shortness of breath developed suddenly. The shortness of breath is mild. The patient's medical history does not include CHF, COPD, asthma or chronic lung disease.   Nausea began today.   Pertinent negatives for associated symptoms include no claudication, no diaphoresis, no lower extremity edema, no near-syncope, no numbness, no orthopnea, no paroxysmal nocturnal dyspnea and no weakness. She tried nitroglycerin and aspirin for the symptoms. Risk factors include being elderly.   Her past medical history is significant for hypertension.     Review of patient's allergies indicates:  No Known Allergies  No past medical history on file.  No past surgical history on file.  No family history on file.     Review of Systems   Constitutional:  Negative for diaphoresis, fatigue and fever.    HENT:  Negative for sore throat.    Respiratory:  Positive for shortness of breath. Negative for cough and wheezing.    Cardiovascular:  Positive for chest pain. Negative for palpitations, orthopnea, claudication, syncope and near-syncope.   Gastrointestinal:  Positive for nausea. Negative for abdominal pain and vomiting.   Genitourinary:  Negative for dysuria.   Musculoskeletal:  Negative for back pain.   Skin:  Negative for rash.   Neurological:  Negative for dizziness, weakness and numbness.   Hematological:  Does not bruise/bleed easily.   All other systems reviewed and are negative.      Physical Exam     Initial Vitals [09/09/23 1542]   BP Pulse Resp Temp SpO2   (!) 165/83 70 16 98.3 °F (36.8 °C) 98 %      MAP       --         Physical Exam    Nursing note and vitals reviewed.  Constitutional: She is not diaphoretic. No distress.   HENT:   Head: Normocephalic and atraumatic.   Mouth/Throat: Oropharynx is clear and moist.   Eyes: Conjunctivae and EOM are normal. Pupils are equal, round, and reactive to light.   Neck: Neck supple. No JVD present.   Normal range of motion.  Cardiovascular:  Normal rate, regular rhythm and normal heart sounds.           No murmur heard.  Pulmonary/Chest: Breath sounds normal. No respiratory distress. She has no wheezes. She has no rhonchi.   Abdominal: Abdomen is soft. Bowel sounds are normal. There is no abdominal tenderness. There is no rebound and no guarding.   Musculoskeletal:         General: Normal range of motion.      Cervical back: Normal range of motion and neck supple.     Neurological: She is alert and oriented to person, place, and time. She has normal strength. No sensory deficit. GCS score is 15. GCS eye subscore is 4. GCS verbal subscore is 5. GCS motor subscore is 6.   Skin: Skin is warm and dry. Capillary refill takes less than 2 seconds.   Psychiatric: She has a normal mood and affect. Her behavior is normal. Judgment and thought content normal.         ED  Course   Critical Care    Date/Time: 9/9/2023 7:06 PM    Performed by: Kathryn Hawkins MD  Authorized by: Kathryn Hawkins MD  Direct patient critical care time: 10 minutes  Ordering / reviewing critical care time: 10 minutes  Consulting other physicians critical care time: 10 minutes  Consult with family critical care time: 10 minutes  Total critical care time (exclusive of procedural time) : 40 minutes  Critical care was time spent personally by me on the following activities: re-evaluation of patient's condition, ordering and review of radiographic studies, ordering and review of laboratory studies, evaluation of patient's response to treatment and discussions with consultants.  Subsequent provider of critical care: I assumed direction of critical care for this patient from another provider of my specialty.  Comments: Patient's care was assumed from Dr. Olivia        Labs Reviewed   COMPREHENSIVE METABOLIC PANEL - Abnormal; Notable for the following components:       Result Value    Blood Urea Nitrogen 24.0 (*)     Globulin 3.7 (*)     Albumin/Globulin Ratio 1.0 (*)     All other components within normal limits   CBC WITH DIFFERENTIAL - Abnormal; Notable for the following components:    WBC 3.94 (*)     MCV 94.1 (*)     MCHC 32.0 (*)     All other components within normal limits   MAGNESIUM - Normal   PROTIME-INR - Normal   LIPASE - Normal   TROPONIN I - Normal   CK - Normal   CK-MB - Normal   CBC W/ AUTO DIFFERENTIAL    Narrative:     The following orders were created for panel order CBC auto differential.  Procedure                               Abnormality         Status                     ---------                               -----------         ------                     CBC with Differential[[id number]]       Abnormal            Final result                 Please view results for these tests on the individual orders.   TROPONIN I   APTT   B-TYPE NATRIURETIC PEPTIDE     EKG Readings: (Independently  Interpreted)   09/09/2023 at 3:46 p.m.   Ventricular rate rhythm  Leftward axis   QRS widened 140   Bifascicular block (left anterior fascicular block and right bundle-branch block)  No STEMI  Interpreted by MD     ECG Results              EKG 12-lead (Final result)  Result time 09/09/23 19:21:45      Final result by Interface, Lab In [facility] (09/09/23 19:21:45)                   Narrative:    Test Reason : R07.9,    Vent. Rate : 065 BPM     Atrial Rate : 065 BPM     P-R Int : 138 ms          QRS Dur : 140 ms      QT Int : 456 ms       P-R-T Axes : 075 -30 041 degrees     QTc Int : 474 ms    Normal sinus rhythm  Biatrial enlargement  Left axis deviation  Right bundle branch block  Abnormal ECG  No previous ECGs available  Confirmed by Juliano Hilario MD ([id number]) on 9/9/2023 7:21:39 PM    Referred By: [email or web address]   SELF           Confirmed By:Juliano Hilario MD                                  Imaging Results              CTA Chest Non-Coronary (PE Studies) (Preliminary result)  Result time 09/09/23 17:58:03      Preliminary result by Quinn Duran MD (09/09/23 17:58:03)                   Narrative:    START OF REPORT:  Technique: CT Scan of the chest was performed with intravenous contrast with direct axial images as well as sagittal and coronal reconstruction images pulmonary embolus protocol.    Dosage Information: Automated Exposure Control was utilized 157.23 mGy.cm.    Comparison: None.    Clinical History: SOB.    Findings:  Neck: The visualized soft tissues of the neck appear unremarkable.  Mediastinum: The mediastinal structures are within normal limits.  Heart: The heart size is within normal limits. Mild coronary artery calcification is seen.  Aorta: Aortic calcification is seen in the arch and descending thoracic aorta.  Pulmonary Arteries: Multiple pulmonary emboli are seen in the pulmonary arteries supplying the left upper lobe (series 10, images 5-12), left lower lobe (series 10, images 217-236), right  upper lobe (series 10, images 139-145), right middle lobe (series 10, images 156-172), as well as the right lower lobe (series 10, images 226-229, 242-256). No definitive right heart strain is identified.  Lungs: Mild streaky opacity is seen in the dependent portions of the lung bases consistent with subsegmental and or nonspecific dependent changes. Minimal fibrotic densities are seen in the right middle lobe. No gross focal infiltrate or consolidation is identified.  Pleura: No effusions or pneumothorax are identified.  Bony Structures:  Spine: Mild spondylolytic changes are seen in the thoracic spine.  Ribs: The bilateral ribs appear unremarkable.  Abdomen: A 1.4 cm left renal cyst is seen in the superior of the left kidney (series 4, images 85-88). Multiple hypodense foci are seen scattered in the visualized liver, the largest of which measures 4.2 cm. These likely reflect hepatic cysts. Atherosclerotic abdominal aorta is seen. The rest of the visualized upper abdominal organs appear unremarkable.      Impression:  1. Multiple pulmonary emboli are seen in the pulmonary arteries supplying the left upper lobe (series 10, images 5-12), left lower lobe (series 10, images 217-236), right upper lobe (series 10, images 139-145), right middle lobe (series 10, images 156-172), as well as the right lower lobe (series 10, images 226-229, 242-256). No definitive right heart strain is identified.  2. No gross focal infiltrate or consolidation is identified.  3. Details and other findings as discussed above.                                         X-Ray Chest 1 View (Final result)  Result time 09/09/23 16:24:40      Final result by Brenton Méndez MD (09/09/23 16:24:40)                   Impression:      No acute cardiopulmonary abnormality.      Electronically signed by: Brenton Méndez MD  Date:    09/09/2023  Time:    16:24               Narrative:    EXAMINATION:  Single view chest radiograph.    CLINICAL HISTORY:  chest  pain;    TECHNIQUE:  Single view of the chest.    COMPARISON:  None.    FINDINGS:  The lungs are clear without consolidation or effusion.  There is no pneumothorax.  The cardiac silhouette is normal in size.  There is no acute osseous abnormality.                                       Medications   hydrALAZINE injection 10 mg (10 mg Intravenous Not Given 9/9/23 1715)   heparin 25,000 units in dextrose 5% 250 mL (100 units/mL) infusion HIGH INTENSITY nomogram - LAF (18 Units/kg/hr × 53.9 kg (Adjusted) Intravenous New Bag 9/9/23 1911)   heparin 25,000 units in dextrose 5% (100 units/ml) IV bolus from bag - ADDITIONAL PRN BOLUS - 60 units/kg (has no administration in time range)   heparin 25,000 units in dextrose 5% (100 units/ml) IV bolus from bag - ADDITIONAL PRN BOLUS - 30 units/kg (has no administration in time range)   amLODIPine tablet 5 mg (5 mg Oral Given 9/9/23 1747)   iopamidoL (ISOVUE-370) injection 100 mL (100 mLs Intravenous Given 9/9/23 1757)   nitroGLYCERIN 2% TD oint ointment 1 inch (1 inch Transdermal Given 9/9/23 1833)   heparin 25,000 units in dextrose 5% (100 units/ml) IV bolus from bag INITIAL BOLUS (4,312 Units Intravenous Bolus from Bag 9/9/23 1917)     Medical Decision Making  Patient is a 89-year-old female with a history of hypertension hyperlipidemia began having some anterior chest pain radiating to her back with shortness of breath that was short-lived in currently resolved.  Patient also with elevated blood pressure, transient anterior chest pain to back, will obtain CT of the chest.  Patient's initial troponin negative, heart score 3.  Patient will benefit from repeat markers here in the emergency department.    1800: Patient handed off at regular change of shift to Dr. Hawkins.          Patient had significant PE, still BP is very high, heparin IV drip was initiated, patient requires admission for anticoagulation    Amount and/or Complexity of Data Reviewed  Labs: ordered.  Radiology:  ordered.     Details: CT OF CHEST PE PROTOCOL:  1. Multiple pulmonary emboli are seen in the pulmonary arteries supplying the left upper lobe (series 10, images 5-12), left lower lobe (series 10, images 217-236), right upper lobe (series 10, images 139-145), right middle lobe (series 10, images 156-172), as well as the right lower lobe (series 10, images 226-229, 242-256). No definitive right heart strain is identified.  2. No gross focal infiltrate or consolidation is identified.  3. Details and other findings as discussed above.  ECG/medicine tests: independent interpretation performed.     Details: 2nd EKG revealed:  Sinus rhythm (HR 59), complete RBBB, prolonged QT interval, no T-wave inversion, no ST changes (EKG is not changed from 4 hours ago), there is no medical records of previous EKG available on the computer    Risk  Prescription drug management.  Decision regarding hospitalization.      Additional MDM:   Heart Score:    History:          Slightly suspicious.  ECG:             Normal  Age:               >65 years  Risk factors: 1-2 risk factors  Troponin:       Less than or equal to normal limit  Heart Score = 3                ED Course as of 09/09/23 1943   Sat Sep 09, 2023   1821 Patient's care was assumed from Dr. Olivia, medical records are reviewed and patient was re-examined aided, patient was brought from nursing home with short period of chest pain, patient received aspirin, currently pain-free, patient has history of malignant hypertension, 1st troponin is negative, 2nd troponin blood draw is scheduled at 7:00 p.m. CXR is negative, CT of chest (PE protocol) is currently pending [IP]   1830 Radiologist on-call (Dr. Santoro) called about abnormal result CTA of chest, confirming that patient has very significant PE [IP]   1942 Patient was accepted by Dr. Mcknight, imaging and lab results been discussed on the phone, he agreed to proceed with IV heparin drip, requested to order echo and bilateral lower  extremity venous ultrasound [IP]      ED Course User Index  [IP] Kathryn Hawkins MD                    Clinical Impression:   Final diagnoses:  [R07.9] Chest pain  [I26.99] Pulmonary embolism, unspecified chronicity, unspecified pulmonary embolism type, unspecified whether acute cor pulmonale present (Primary)  [I10] Malignant hypertension  [I26.99] Pulmonary embolus        ED Disposition Condition    Admit Stable                Kathryn Hawkins MD  09/09/23 1943

## 2023-09-10 LAB
ALBUMIN SERPL-MCNC: 3.3 G/DL (ref 3.4–4.8)
ALBUMIN/GLOB SERPL: 1 RATIO (ref 1.1–2)
ALP SERPL-CCNC: 42 UNIT/L (ref 40–150)
ALT SERPL-CCNC: 15 UNIT/L (ref 0–55)
APTT PPP: 79.8 SECONDS (ref 24.6–37.2)
APTT PPP: >200 SECONDS (ref 24.6–37.2)
APTT PPP: >200 SECONDS (ref 24.6–37.2)
AST SERPL-CCNC: 26 UNIT/L (ref 5–34)
BASOPHILS # BLD AUTO: 0.03 X10(3)/MCL
BASOPHILS NFR BLD AUTO: 0.5 %
BILIRUB SERPL-MCNC: 0.4 MG/DL
BUN SERPL-MCNC: 22 MG/DL (ref 9.8–20.1)
CALCIUM SERPL-MCNC: 9.5 MG/DL (ref 8.4–10.2)
CHLORIDE SERPL-SCNC: 103 MMOL/L (ref 98–111)
CO2 SERPL-SCNC: 27 MMOL/L (ref 23–31)
CREAT SERPL-MCNC: 0.84 MG/DL (ref 0.55–1.02)
EOSINOPHIL # BLD AUTO: 0.07 X10(3)/MCL (ref 0–0.9)
EOSINOPHIL NFR BLD AUTO: 1.2 %
ERYTHROCYTE [DISTWIDTH] IN BLOOD BY AUTOMATED COUNT: 13.1 % (ref 11.5–17)
EST. AVERAGE GLUCOSE BLD GHB EST-MCNC: 96.8 MG/DL
GFR SERPLBLD CREATININE-BSD FMLA CKD-EPI: >60 MLS/MIN/1.73/M2
GLOBULIN SER-MCNC: 3.3 GM/DL (ref 2.4–3.5)
GLUCOSE SERPL-MCNC: 108 MG/DL (ref 75–121)
HBA1C MFR BLD: 5 %
HCT VFR BLD AUTO: 39 % (ref 37–47)
HGB BLD-MCNC: 12.7 G/DL (ref 12–16)
IMM GRANULOCYTES # BLD AUTO: 0 X10(3)/MCL (ref 0–0.04)
IMM GRANULOCYTES NFR BLD AUTO: 0 %
LYMPHOCYTES # BLD AUTO: 0.9 X10(3)/MCL (ref 0.6–4.6)
LYMPHOCYTES NFR BLD AUTO: 15.8 %
MAGNESIUM SERPL-MCNC: 1.9 MG/DL (ref 1.6–2.6)
MCH RBC QN AUTO: 30.2 PG (ref 27–31)
MCHC RBC AUTO-ENTMCNC: 32.6 G/DL (ref 33–36)
MCV RBC AUTO: 92.9 FL (ref 80–94)
MONOCYTES # BLD AUTO: 0.43 X10(3)/MCL (ref 0.1–1.3)
MONOCYTES NFR BLD AUTO: 7.5 %
NEUTROPHILS # BLD AUTO: 4.27 X10(3)/MCL (ref 2.1–9.2)
NEUTROPHILS NFR BLD AUTO: 75 %
PHOSPHATE SERPL-MCNC: 2.6 MG/DL (ref 2.3–4.7)
PLATELET # BLD AUTO: 239 X10(3)/MCL (ref 130–400)
PMV BLD AUTO: 10.1 FL (ref 7.4–10.4)
POTASSIUM SERPL-SCNC: 3.8 MMOL/L (ref 3.5–5.1)
PROT SERPL-MCNC: 6.6 GM/DL (ref 5.8–7.6)
RBC # BLD AUTO: 4.2 X10(6)/MCL (ref 4.2–5.4)
SODIUM SERPL-SCNC: 137 MMOL/L (ref 132–146)
TSH SERPL-ACNC: 4.33 UIU/ML (ref 0.35–4.94)
WBC # SPEC AUTO: 5.7 X10(3)/MCL (ref 4.5–11.5)

## 2023-09-10 PROCEDURE — 94761 N-INVAS EAR/PLS OXIMETRY MLT: CPT

## 2023-09-10 PROCEDURE — 85025 COMPLETE CBC W/AUTO DIFF WBC: CPT | Performed by: INTERNAL MEDICINE

## 2023-09-10 PROCEDURE — 83735 ASSAY OF MAGNESIUM: CPT | Performed by: INTERNAL MEDICINE

## 2023-09-10 PROCEDURE — 85730 THROMBOPLASTIN TIME PARTIAL: CPT | Performed by: INTERNAL MEDICINE

## 2023-09-10 PROCEDURE — 84443 ASSAY THYROID STIM HORMONE: CPT | Performed by: INTERNAL MEDICINE

## 2023-09-10 PROCEDURE — 84100 ASSAY OF PHOSPHORUS: CPT | Performed by: INTERNAL MEDICINE

## 2023-09-10 PROCEDURE — 80053 COMPREHEN METABOLIC PANEL: CPT | Performed by: INTERNAL MEDICINE

## 2023-09-10 PROCEDURE — 83036 HEMOGLOBIN GLYCOSYLATED A1C: CPT | Performed by: INTERNAL MEDICINE

## 2023-09-10 PROCEDURE — 25000003 PHARM REV CODE 250: Performed by: INTERNAL MEDICINE

## 2023-09-10 PROCEDURE — 21400001 HC TELEMETRY ROOM

## 2023-09-10 RX ORDER — ACETAMINOPHEN 325 MG/1
650 TABLET ORAL EVERY 6 HOURS PRN
Status: DISCONTINUED | OUTPATIENT
Start: 2023-09-10 | End: 2023-09-14 | Stop reason: HOSPADM

## 2023-09-10 RX ADMIN — ARIPIPRAZOLE 5 MG: 5 TABLET ORAL at 09:09

## 2023-09-10 RX ADMIN — Medication 1 TABLET: at 09:09

## 2023-09-10 RX ADMIN — VENLAFAXINE HYDROCHLORIDE 150 MG: 150 CAPSULE, EXTENDED RELEASE ORAL at 09:09

## 2023-09-10 RX ADMIN — ACETAMINOPHEN 650 MG: 325 TABLET, FILM COATED ORAL at 04:09

## 2023-09-10 RX ADMIN — FOLIC ACID 1 MG: 1 TABLET ORAL at 09:09

## 2023-09-10 RX ADMIN — MULTIPLE VITAMINS W/ MINERALS TAB 1 TABLET: TAB at 09:09

## 2023-09-10 RX ADMIN — ASPIRIN 81 MG: 81 TABLET, COATED ORAL at 09:09

## 2023-09-10 RX ADMIN — PANTOPRAZOLE SODIUM 40 MG: 40 TABLET, DELAYED RELEASE ORAL at 09:09

## 2023-09-10 RX ADMIN — AMLODIPINE BESYLATE 5 MG: 5 TABLET ORAL at 09:09

## 2023-09-10 RX ADMIN — DOCUSATE SODIUM 100 MG: 100 CAPSULE, LIQUID FILLED ORAL at 09:09

## 2023-09-10 RX ADMIN — CLONAZEPAM 0.5 MG: 0.5 TABLET ORAL at 09:09

## 2023-09-10 RX ADMIN — FENOFIBRATE 145 MG: 145 TABLET, COATED ORAL at 09:09

## 2023-09-10 RX ADMIN — Medication 1 TABLET: at 06:09

## 2023-09-10 NOTE — PLAN OF CARE
Problem: Adult Inpatient Plan of Care  Goal: Plan of Care Review  Outcome: Ongoing, Progressing  Goal: Patient-Specific Goal (Individualized)  Outcome: Ongoing, Progressing  Goal: Absence of Hospital-Acquired Illness or Injury  Outcome: Ongoing, Progressing  Goal: Optimal Comfort and Wellbeing  Outcome: Ongoing, Progressing  Goal: Readiness for Transition of Care  Outcome: Ongoing, Progressing     Problem: Fall Injury Risk  Goal: Absence of Fall and Fall-Related Injury  Outcome: Ongoing, Progressing     Problem: Hypertension Comorbidity  Goal: Blood Pressure in Desired Range  Outcome: Ongoing, Progressing     Problem: VTE (Venous Thromboembolism)  Goal: VTE (Venous Thromboembolism) Symptom Resolution  Outcome: Ongoing, Progressing

## 2023-09-10 NOTE — H&P
[facility]                     [address] [facility] [id number]    PATIENT NAME:       RICHARD RG   YOB: 1933  CSN:                [id number]   MRN:                [id number]  ADMIT DATE:         09/09/2023 15:46:00  PHYSICIAN:          Virgil Mcknight MD                        HISTORY AND PHYSICAL      CODE STATUS:  Full code.    CHIEF COMPLAINT:  The patient was brought to the emergency room from [facility] because of chest pain in the front.    HISTORY OF PRESENT ILLNESS:  Ms. Swapna Hartman is a very pleasant 89-year-old   active female, a resident of [facility], which used to be [facility] in the past because she was having intermittent chest pain and   ambulance gave her a dose of nitroglycerin and aspirin.  When the patient   arrived in the ER, she also had some shortness of breath and nausea, but she did   not have any other specific complaint.  The ER doctor worked up the patient.    Chest x-ray was negative for any infiltrate.  The cardiac enzyme was not   elevated.  She had elevated systolic blood pressure and the EKG of the patient   showed complete right bundle branch block and the chest pain was still there in   spite of being given nitroglycerin and shortness of breath.  The EKG showed   bifascicular block, left anterior fascicular block, and right bundle branch   block.  We did a CAT scan for PE protocol because of the shortness of breath and   the radiologist immediately called the ER doctor because of multiple pulmonary   emboli, which has as if they have been showered into the lungs bilaterally.  The   patient was immediately started on heparin drip.  Bilateral ultrasound of the   lower extremities was negative for any DVT.  The results of echocardiogram,   which I believe has been done are awaited.  The patient herself does not have   any history of  arrhythmia, but to my knowledge when I have in my office notes in   the previous physician has a diagnosis of arrhythmia, but she was never on any   anticoagulation.  The patient is also not sedentary and is ambulating in   assisted living freely.  There was no acute cor pulmonale.  I have seen the   patient, explained to her what has happened, and she understands that and she is   comfortable.  Her chest pain is less.  She also has a history of anxiety.    PAST MEDICAL HISTORY:  Basically significant for arthritis, depression,   gastroesophageal reflux disease, dyslipidemia, constipation.    PAST SURGICAL HISTORY:  Just pertinent for bladder suspension.    SOCIAL HISTORY:  Not a smoker.  Did not drink.  She and her  owned a   rental business.  She is a  now and then now a resident of assisted living.    FAMILY HISTORY:  Pertinent for mother dying in her 70s and father also  in   his 80s.  She had 8 brothers and sister also, of which only 1 sister is alive   who is older to her and is 90.  She had 2 brothers who passed away and 4 sisters   who passed away.  She herself has 4 girls or daughters.    ALLERGIES:  NO KNOWN DRUG ALLERGIES.     MEDICATIONS:  On which the patient is on are as follows:  Amlodipine 5 mg daily,   Abilify 5 mg daily, vitamin B12 250 mcg daily, calcium with vitamin D 1 tablet   twice daily, clonazepam 0.5 mg half tablet q.h.s., Questran 625 mg 1 tablet   every day, cranberry 1 capsule twice daily, fenofibrate 145 mg at bedtime, fish   oil omega-3 1 capsule of 1000 mg 3 times a day, folic acid 1 mg daily,   glucosamine chondroitin 1 capsule twice daily, Icar Plus 1 tablet daily,   multivitamin 1 tablet daily, pantoprazole 40 mg daily, stool softener that is   Colace 100 mg 2 capsules in the morning, 1 capsule at bedtime; Effexor  mg   in the morning, vitamin D 67688 once a week on Friday, meclizine 12.5 mg p.r.n.   for dizziness b.i.d.    REVIEW OF SYSTEMS:  As mentioned in  history of present illness.    PHYSICAL EXAMINATION:  GENERAL:  The patient is alert, oriented x3.    VITAL SIGNS:  Her blood pressure is slightly elevated at 174/96.  Hydralazine   p.r.n. or labetalol p.r.n. is in place.  HEENT:  Head normocephalic. Pupils bilaterally react to light, equal size. Mild   conjunctival pallor. No scleral icterus. Trachea is in midline.  CHEST: Has good bilateral air entry.  CVS: First and second heart sounds are heard normally without any added murmurs.  ABDOMEN: Soft and nontender.  EXTREMITIES:  Devoid of edema, cyanosis, or clubbing.    LABS AND INVESTIGATIONS:  WBC 3.94, hemoglobin 13.3, hematocrit 41.5, MCV 94.1,   platelet count 255.  Sodium 138, potassium 3.9, chloride 101, bicarb 27, BUN 24,   creatinine 0.83, GFR of more than 60.      CAT scan for PE protocol shows multiple pulmonary emboli seen in the pulmonary   artery supplying the left upper lobe, left lower lobe, right upper lobe, right   middle lobe, as well as right lower lobe.  No gross focal infiltrates.  No   definitive right heart strain is identified.  Chest x-ray devoid of any acute   cardiopulmonary symptoms.  Bilateral ultrasound of the legs negative for any   DVT.  12-lead EKG shows biatrial enlargement, normal sinus rhythm, left axis   deviation.  Right bundle branch block.    IMPRESSION:    1. Chest pain, intermittent with shortness of breath.  Cardiac enzyme negative.    CT scan, pulmonary protocol showing multiple pulmonary emboli.  2. History of uncontrolled systolic hypertension.  3. Chest pain.  4. History of hypertension.  5. History of osteoporosis.  6. History of gastroesophageal reflux disease.  7. History of anxiety.  8. History of depression.  9. History of constipation.    PLAN:  We will admit the patient to the floor.  The patient is hemodynamically   stable.  No cor pulmonale was seen.  Continue with the heparin drip and follow   the heparin protocol.  Continue with home medications, p.r.n.  hydralazine,   intravenous push, or labetalol for systolic blood pressure more than 160.  Will   transition the patient to oral anticoagulation, but we will see and wait for the   results of the echocardiogram.      Please put the patient on telemetry and watch for any arrhythmia     Pleasure taking care of Ms. Swapna Hartman on the 3rd floor at [facility].        ______________________________  Virgil Mcknight MD    SS/JAYASHREE  DD:  09/09/2023  Time:  11:06PM  DT:  09/10/2023  Time:  12:27AM  Job #:  [id number]      HISTORY AND PHYSICAL

## 2023-09-10 NOTE — PLAN OF CARE
Problem: Adult Inpatient Plan of Care  Goal: Plan of Care Review  Outcome: Ongoing, Progressing  Goal: Patient-Specific Goal (Individualized)  Outcome: Ongoing, Progressing  Goal: Absence of Hospital-Acquired Illness or Injury  Outcome: Ongoing, Progressing  Goal: Optimal Comfort and Wellbeing  Outcome: Ongoing, Progressing  Goal: Readiness for Transition of Care  Outcome: Ongoing, Progressing     Problem: Fall Injury Risk  Goal: Absence of Fall and Fall-Related Injury  Outcome: Ongoing, Progressing  Intervention: Identify and Manage Contributors  Flowsheets (Taken 9/10/2023 0009)  Self-Care Promotion: BADL personal objects within reach  Medication Review/Management: medications reviewed  Intervention: Promote Injury-Free Environment  Flowsheets (Taken 9/10/2023 0009)  Safety Promotion/Fall Prevention: assistive device/personal item within reach     Problem: Hypertension Comorbidity  Goal: Blood Pressure in Desired Range  Outcome: Ongoing, Progressing  Intervention: Maintain Blood Pressure Management  Flowsheets (Taken 9/10/2023 0009)  Syncope Management: position changed slowly  Medication Review/Management: medications reviewed     Problem: VTE (Venous Thromboembolism)  Goal: VTE (Venous Thromboembolism) Symptom Resolution  Outcome: Ongoing, Progressing  Intervention: Prevent or Manage VTE (Venous Thromboembolism)  Flowsheets (Taken 9/10/2023 0009)  Bleeding Precautions: monitored for signs of bleeding  VTE Prevention/Management:   fluids promoted   ROM (passive) performed     Problem: VTE (Venous Thromboembolism)  Goal: VTE (Venous Thromboembolism) Symptom Resolution  Intervention: Prevent or Manage VTE (Venous Thromboembolism)  Flowsheets (Taken 9/10/2023 0009)  Bleeding Precautions: monitored for signs of bleeding  VTE Prevention/Management:   fluids promoted   ROM (passive) performed

## 2023-09-10 NOTE — PLAN OF CARE
Problem: Adult Inpatient Plan of Care  Goal: Plan of Care Review  Outcome: Ongoing, Progressing  Goal: Patient-Specific Goal (Individualized)  Outcome: Ongoing, Progressing  Goal: Absence of Hospital-Acquired Illness or Injury  Outcome: Ongoing, Progressing  Goal: Optimal Comfort and Wellbeing  Outcome: Ongoing, Progressing  Goal: Readiness for Transition of Care  Outcome: Ongoing, Progressing     Problem: Fall Injury Risk  Goal: Absence of Fall and Fall-Related Injury  Outcome: Ongoing, Progressing  Intervention: Identify and Manage Contributors  Flowsheets (Taken 9/10/2023 0009)  Self-Care Promotion: BADL personal objects within reach  Medication Review/Management: medications reviewed  Intervention: Promote Injury-Free Environment  Flowsheets (Taken 9/10/2023 0009)  Safety Promotion/Fall Prevention: assistive device/personal item within reach     Problem: Hypertension Comorbidity  Goal: Blood Pressure in Desired Range  Outcome: Ongoing, Progressing  Intervention: Maintain Blood Pressure Management  Flowsheets (Taken 9/10/2023 0009)  Syncope Management: position changed slowly  Medication Review/Management: medications reviewed     Problem: VTE (Venous Thromboembolism)  Goal: VTE (Venous Thromboembolism) Symptom Resolution  Outcome: Ongoing, Progressing  Intervention: Prevent or Manage VTE (Venous Thromboembolism)  Flowsheets (Taken 9/10/2023 0009)  Bleeding Precautions: monitored for signs of bleeding  VTE Prevention/Management:   fluids promoted   ROM (passive) performed

## 2023-09-11 LAB
ANION GAP SERPL CALC-SCNC: 6 MEQ/L
APTT PPP: 90.5 SECONDS (ref 24.6–37.2)
AV PEAK GRADIENT: 9 MMHG
AV REGURGITATION PRESSURE HALF TIME: 715.5 MS
AV VALVE AREA BY VELOCITY RATIO: 2.19 CM²
AV VELOCITY RATIO: 0.77
BASOPHILS # BLD AUTO: 0.03 X10(3)/MCL
BASOPHILS NFR BLD AUTO: 0.9 %
BSA FOR ECHO PROCEDURE: 1.58 M2
BUN SERPL-MCNC: 28 MG/DL (ref 9.8–20.1)
CALCIUM SERPL-MCNC: 10.1 MG/DL (ref 8.4–10.2)
CHLORIDE SERPL-SCNC: 100 MMOL/L (ref 98–111)
CO2 SERPL-SCNC: 30 MMOL/L (ref 23–31)
CREAT SERPL-MCNC: 0.86 MG/DL (ref 0.55–1.02)
CREAT/UREA NIT SERPL: 33
CV ECHO LV RWT: 0.73 CM
DOP CALC AO PEAK VEL: 1.54 M/S
DOP CALC LVOT AREA: 2.8 CM2
DOP CALC LVOT DIAMETER: 1.9 CM
DOP CALC LVOT PEAK VEL: 1.19 M/S
DOP CALC MV VTI: 35 CM
ECHO LV POSTERIOR WALL: 1.34 CM (ref 0.6–1.1)
EOSINOPHIL # BLD AUTO: 0.12 X10(3)/MCL (ref 0–0.9)
EOSINOPHIL NFR BLD AUTO: 3.6 %
ERYTHROCYTE [DISTWIDTH] IN BLOOD BY AUTOMATED COUNT: 13.2 % (ref 11.5–17)
FRACTIONAL SHORTENING: 40 % (ref 28–44)
GFR SERPLBLD CREATININE-BSD FMLA CKD-EPI: >60 MLS/MIN/1.73/M2
GLUCOSE SERPL-MCNC: 104 MG/DL (ref 75–121)
HCT VFR BLD AUTO: 38.8 % (ref 37–47)
HGB BLD-MCNC: 12.6 G/DL (ref 12–16)
IMM GRANULOCYTES # BLD AUTO: 0.01 X10(3)/MCL (ref 0–0.04)
IMM GRANULOCYTES NFR BLD AUTO: 0.3 %
INTERVENTRICULAR SEPTUM: 1.02 CM (ref 0.6–1.1)
LEFT ATRIUM SIZE: 0 CM
LEFT INTERNAL DIMENSION IN SYSTOLE: 2.19 CM (ref 2.1–4)
LEFT VENTRICLE DIASTOLIC VOLUME INDEX: 35.77 ML/M2
LEFT VENTRICLE DIASTOLIC VOLUME: 56.51 ML
LEFT VENTRICLE MASS INDEX: 89 G/M2
LEFT VENTRICLE SYSTOLIC VOLUME INDEX: 10.1 ML/M2
LEFT VENTRICLE SYSTOLIC VOLUME: 16.03 ML
LEFT VENTRICULAR INTERNAL DIMENSION IN DIASTOLE: 3.66 CM (ref 3.5–6)
LEFT VENTRICULAR MASS: 141.32 G
LYMPHOCYTES # BLD AUTO: 0.91 X10(3)/MCL (ref 0.6–4.6)
LYMPHOCYTES NFR BLD AUTO: 27.7 %
MAGNESIUM SERPL-MCNC: 2 MG/DL (ref 1.6–2.6)
MCH RBC QN AUTO: 30.3 PG (ref 27–31)
MCHC RBC AUTO-ENTMCNC: 32.5 G/DL (ref 33–36)
MCV RBC AUTO: 93.3 FL (ref 80–94)
MONOCYTES # BLD AUTO: 0.46 X10(3)/MCL (ref 0.1–1.3)
MONOCYTES NFR BLD AUTO: 14 %
MV MEAN GRADIENT: 1 MMHG
MV PEAK GRADIENT: 3 MMHG
NEUTROPHILS # BLD AUTO: 1.76 X10(3)/MCL (ref 2.1–9.2)
NEUTROPHILS NFR BLD AUTO: 53.5 %
PHOSPHATE SERPL-MCNC: 2.9 MG/DL (ref 2.3–4.7)
PISA AR MAX VEL: 2.27 M/S
PISA MRMAX VEL: 3.26 M/S
PISA TR MAX VEL: 1.86 M/S
PLATELET # BLD AUTO: 251 X10(3)/MCL (ref 130–400)
PMV BLD AUTO: 10.2 FL (ref 7.4–10.4)
POTASSIUM SERPL-SCNC: 4.4 MMOL/L (ref 3.5–5.1)
PV PEAK GRADIENT: 2 MMHG
PV PEAK VELOCITY: 0.68 M/S
RBC # BLD AUTO: 4.16 X10(6)/MCL (ref 4.2–5.4)
RIGHT VENTRICULAR END-DIASTOLIC DIMENSION: 2.18 CM
SODIUM SERPL-SCNC: 136 MMOL/L (ref 132–146)
TR MAX PG: 14 MMHG
WBC # SPEC AUTO: 3.29 X10(3)/MCL (ref 4.5–11.5)
Z-SCORE OF LEFT VENTRICULAR DIMENSION IN END DIASTOLE: -2.12
Z-SCORE OF LEFT VENTRICULAR DIMENSION IN END SYSTOLE: -1.96

## 2023-09-11 PROCEDURE — 83735 ASSAY OF MAGNESIUM: CPT | Performed by: INTERNAL MEDICINE

## 2023-09-11 PROCEDURE — 84100 ASSAY OF PHOSPHORUS: CPT | Performed by: INTERNAL MEDICINE

## 2023-09-11 PROCEDURE — 94761 N-INVAS EAR/PLS OXIMETRY MLT: CPT

## 2023-09-11 PROCEDURE — 25000003 PHARM REV CODE 250: Performed by: INTERNAL MEDICINE

## 2023-09-11 PROCEDURE — 21400001 HC TELEMETRY ROOM

## 2023-09-11 PROCEDURE — 85730 THROMBOPLASTIN TIME PARTIAL: CPT | Performed by: INTERNAL MEDICINE

## 2023-09-11 PROCEDURE — 63600175 PHARM REV CODE 636 W HCPCS: Performed by: INTERNAL MEDICINE

## 2023-09-11 PROCEDURE — 85306 CLOT INHIBIT PROT S FREE: CPT | Performed by: INTERNAL MEDICINE

## 2023-09-11 PROCEDURE — 86147 CARDIOLIPIN ANTIBODY EA IG: CPT | Performed by: INTERNAL MEDICINE

## 2023-09-11 PROCEDURE — 81241 F5 GENE: CPT | Performed by: INTERNAL MEDICINE

## 2023-09-11 PROCEDURE — 86160 COMPLEMENT ANTIGEN: CPT | Performed by: INTERNAL MEDICINE

## 2023-09-11 PROCEDURE — 85025 COMPLETE CBC W/AUTO DIFF WBC: CPT | Performed by: INTERNAL MEDICINE

## 2023-09-11 PROCEDURE — 86039 ANTINUCLEAR ANTIBODIES (ANA): CPT | Performed by: INTERNAL MEDICINE

## 2023-09-11 PROCEDURE — 80048 BASIC METABOLIC PNL TOTAL CA: CPT | Performed by: INTERNAL MEDICINE

## 2023-09-11 RX ADMIN — ASPIRIN 81 MG: 81 TABLET, COATED ORAL at 09:09

## 2023-09-11 RX ADMIN — DOCUSATE SODIUM 100 MG: 100 CAPSULE, LIQUID FILLED ORAL at 10:09

## 2023-09-11 RX ADMIN — DOCUSATE SODIUM 200 MG: 100 CAPSULE, LIQUID FILLED ORAL at 09:09

## 2023-09-11 RX ADMIN — VENLAFAXINE HYDROCHLORIDE 150 MG: 150 CAPSULE, EXTENDED RELEASE ORAL at 09:09

## 2023-09-11 RX ADMIN — PANTOPRAZOLE SODIUM 40 MG: 40 TABLET, DELAYED RELEASE ORAL at 09:09

## 2023-09-11 RX ADMIN — Medication 1 TABLET: at 05:09

## 2023-09-11 RX ADMIN — Medication 1 TABLET: at 09:09

## 2023-09-11 RX ADMIN — CLONAZEPAM 0.5 MG: 0.5 TABLET ORAL at 10:09

## 2023-09-11 RX ADMIN — HEPARIN SODIUM 10 UNITS/KG/HR: 10000 INJECTION, SOLUTION INTRAVENOUS at 05:09

## 2023-09-11 RX ADMIN — PANTOPRAZOLE SODIUM 40 MG: 40 TABLET, DELAYED RELEASE ORAL at 10:09

## 2023-09-11 RX ADMIN — MULTIPLE VITAMINS W/ MINERALS TAB 1 TABLET: TAB at 09:09

## 2023-09-11 RX ADMIN — ARIPIPRAZOLE 5 MG: 5 TABLET ORAL at 10:09

## 2023-09-11 RX ADMIN — ACETAMINOPHEN 650 MG: 325 TABLET, FILM COATED ORAL at 01:09

## 2023-09-11 RX ADMIN — AMLODIPINE BESYLATE 5 MG: 5 TABLET ORAL at 09:09

## 2023-09-11 RX ADMIN — ACETAMINOPHEN 650 MG: 325 TABLET, FILM COATED ORAL at 10:09

## 2023-09-11 RX ADMIN — FENOFIBRATE 145 MG: 145 TABLET, COATED ORAL at 10:09

## 2023-09-11 RX ADMIN — FOLIC ACID 1 MG: 1 TABLET ORAL at 09:09

## 2023-09-11 NOTE — PROGRESS NOTES
[facility]                     [address] [facility] [address] [facility] [address]    PATIENT NAME:       RICHARD RG   YOB: 1933  CSN:                [id number]   MRN:                [id number]  ADMIT DATE:         09/09/2023 15:46:00  PHYSICIAN:          Virgil Mcknight MD                            PROGRESS NOTE    DATE:      The patient was admitted yesterday for chest pain which was atypical.  Cardiac   enzymes were negative.  EKG shows complete right bundle-branch block.  CAT scan   done for the PE protocol showed that patient has practically pulmonary embolism   into every lobe of the lung, but no hypoxia, very paradoxical and very strange.    The patient also has no history of atrial fibrillation.  Her heart rate was in   60s and 70s.  Other than the chest pain, which also had no relation to anything,   patient was asymptomatic.  She was started on heparin drip.  I placed a Heme   Oncology consult to see if falling for any thrombophilic condition like factor V   Leiden deficiency or protein C or S deficiency will be warranted especially at   this age.  She has given birth to 4 children, has bladder suspension surgery,   had no history of bleeding, has no history of any pulmonary embolism.  She is   ambulating.  No risk factors.  Her occult malignancy is the only thing which I   cannot pinpoint and nor has the blood work given me any indication.  She has   been, to my knowledge, up to date with her mammograms.  Her appetite is good.    She has no system specific complaints.  No jaundice.  Her bowel movements are   good.  She has had colonoscopy in the past, so I will definitely seek guidance   from Heme-Onc consult to find any occult or hidden or rare cause of such a   phenomenon where she has thrown pulmonary emboli into each lobe of the lung but   is asymptomatic other than the chest pain which she just had yesterday, before    that she never had any chest pain.    PHYSICAL EXAMINATION:  VITAL SIGNS:  162/77 blood pressure, pulse 77, temperature 97.5, O2 sat 96% on   room air.  HEENT:  Head normocephalic.  Pupils equal size.  Mild conjunctival pallor.  No   scleral icterus.  Trachea has been midline.  CHEST:  Good bilateral air entry.  CVS:  First and second heart sounds are heard normally without any murmurs.   ABDOMEN:  Soft, nontender.  EXTREMITIES:  Devoid of any edema, cyanosis or clubbing.    LABS AND INVESTIGATIONS:  WBC 5.70, hemoglobin 12.7, hematocrit 39.0, platelet   count is adequate at 239.  Chemistry:  Sodium 137, potassium 3.8, chloride 103,   bicarb 27, BUN 22, creatinine 0.84, GFR more than 60.  TSH 4.33.  A1c 5.0.    IMPRESSION:    1. Chest pain.  Investigations revealing pulmonary embolus into each lobe of the   lung.  Chest pain better.  The patient on anticoagulation with heparin drip.  2. History of hypertension, history of dyslipidemia, history of osteoporosis,   history of anxiety and depression.    PLAN:  Continue present line of treatment.  Heme-Oncology consult has been done.    I will order Factor V Leiden, protein S and protein C deficiency blood work   tomorrow.  Echocardiogram report is awaited.  Bilateral ultrasound of the lower   extremities was negative for DVT.    Pleasure taking care of Ms. Minnie Barcenas during her stay at [facility] on the 3rd floor.        ______________________________  Virgil Mcknight MD    SS/JAYASHREE  DD:  09/10/2023  Time:  09:50PM  DT:  09/10/2023  Time:  11:57PM  Job #:  [id number]      PROGRESS NOTE

## 2023-09-11 NOTE — PLAN OF CARE
09/11/23 1250   Discharge Assessment   Assessment Type Discharge Planning Assessment   Source of Information patient   People in Home facility resident   Facility [facility] From[facility]   Do you expect to return to your current living situation? Yes   Do you have help at home or someone to help you manage your care at home? Yes   Who are your caregiver(s) and their phone number(s)? AL staff   Prior to hospitilization cognitive status: Alert/Oriented;No Deficits   Current cognitive status: Alert/Oriented;No Deficits   Equipment Currently Used at Home none   Do you currently have service(s) that help you manage your care at home? No   Do you take prescription medications? Yes   Do you have prescription coverage? Yes   Do you have any problems affording any of your prescribed medications? No   Is the patient taking medications as prescribed? yes   How do you get to doctors appointments? agency   Are you on dialysis? No   Do you take coumadin? No   DME Needed Upon Discharge  none   Discharge Plan discussed with: Patient   Transition of Care Barriers None   Discharge Plan A Assisted Living   Discharge Plan B Home Health   Physical Activity   On average, how many days per week do you engage in moderate to strenuous exercise (like a brisk walk)? Patient refu   On average, how many minutes do you engage in exercise at this level? Patient refu   Financial Resource Strain   How hard is it for you to pay for the very basics like food, housing, medical care, and heating? Patient refu   Housing Stability   In the last 12 months, was there a time when you were not able to pay the mortgage or rent on time? SC   In the last 12 months, was there a time when you did not have a steady place to sleep or slept in a shelter (including now)? SC   Transportation Needs   In the past 12 months, has lack of transportation kept you from medical appointments or from getting medications? Patient refu   In the past  12 months, has lack of transportation kept you from meetings, work, or from getting things needed for daily living? Patient refu   Food Insecurity   Within the past 12 months, you worried that your food would run out before you got the money to buy more. Patient refu   Within the past 12 months, the food you bought just didn't last and you didn't have money to get more. Patient refu   Stress   Do you feel stress - tense, restless, nervous, or anxious, or unable to sleep at night because your mind is troubled all the time - these days? Patient refu   Social Connections   In a typical week, how many times do you talk on the phone with family, friends, or neighbors? Patient refu   How often do you get together with friends or relatives? Patient refu   How often do you attend Muslim or Latter-day services? Patient refu   Do you belong to any clubs or organizations such as Muslim groups, unions, fraternal or athletic groups, or school groups? Patient refu   How often do you attend meetings of the clubs or organizations you belong to? Patient refu   Are you , , , , never , or living with a partner? Patient refu   Alcohol Use   Q1: How often do you have a drink containing alcohol? Patient refu   Q2: How many drinks containing alcohol do you have on a typical day when you are drinking? Patient refu   Q3: How often do you have six or more drinks on one occasion? Patient refu

## 2023-09-11 NOTE — PROGRESS NOTES
"Inpatient Nutrition Evaluation    Admit Date: 9/9/2023   Total duration of encounter: 2 days    Nutrition Recommendation/Prescription     Rec'd continue Heart Healthy Diet as tolerated.   Monitor intake, weight, and labs.     RD following and available as needed. Thank you.     Nutrition Assessment     Chart Review    Reason Seen: continuous nutrition monitoring    Malnutrition Screening Tool Results   Have you recently lost weight without trying?: No  Have you been eating poorly because of a decreased appetite?: No   MST Score: 0     Diagnosis:  Pulmonary Embolism.     Relevant Medical History:  None at this time.     Nutrition-Related Medications:   Calcium-Vitamin D3; Docusate Sodium; Ergocalciferol; Folic Acid; Heparin; MVI; Protonix.    Nutrition-Related Labs:  9/11: WBC 3.29(L); BUN 28.0(H); Alb 3.3(L).     Diet Order: Diet heart healthy  Oral Supplement Order: none  Appetite/Oral Intake: good/% of meals  Factors Affecting Nutritional Intake: none identified  Food/Zoroastrianism/Cultural Preferences: none reported  Food Allergies: none reported    Skin Integrity: bruised (ecchymotic)  Wound(s):       Comments    9/11: Pt with good appetite and intake. Consumed 75% so far today. No recent weight loss noted/reported. Labs and meds reviewed. Will continue to monitor during stay.     Anthropometrics    Height: 5' 3" (160 cm)    Last Weight: 56 kg (123 lb 7.3 oz) (09/09/23 2216) Weight Method: Bed Scale  BMI (Calculated): 21.9  BMI Classification: underweight (BMI less than 22 if >65 years of age)     Ideal Body Weight (IBW), Female: 115 lb     % Ideal Body Weight, Female (lb): 107.36 %                             Usual Weight Provided By: unable to obtain usual weight    Wt Readings from Last 5 Encounters:   09/09/23 56 kg (123 lb 7.3 oz)     Weight Change(s) Since Admission:  Admit Weight: 59 kg (130 lb) (09/09/23 1542)  9/11: Different charted weight. 56 kg and 59 kg.     Patient Education    Not " applicable.    Monitoring & Evaluation     Dietitian will monitor food and beverage intake, energy intake, weight, weight change, electrolyte/renal panel, glucose/endocrine profile, and gastrointestinal profile.  Nutrition Risk/Follow-Up: low (follow-up in 5-7 days)  Patients assigned 'low nutrition risk' status do not qualify for a full nutritional assessment but will be monitored and re-evaluated in a 5-7 day time period. Please consult if re-evaluation needed sooner.

## 2023-09-12 LAB
ANION GAP SERPL CALC-SCNC: 7 MEQ/L
APTT PPP: 112.4 SECONDS (ref 24.6–37.2)
APTT PPP: 62.5 SECONDS (ref 24.6–37.2)
APTT PPP: 63.8 SECONDS (ref 24.6–37.2)
BASOPHILS # BLD AUTO: 0.02 X10(3)/MCL
BASOPHILS NFR BLD AUTO: 0.7 %
BUN SERPL-MCNC: 33 MG/DL (ref 9.8–20.1)
CALCIUM SERPL-MCNC: 9.8 MG/DL (ref 8.4–10.2)
CARDIOLIPIN IGG SER IA-ACNC: <9.4 GPL
CARDIOLIPIN IGM SER IA-ACNC: <9.4 MPL
CHLORIDE SERPL-SCNC: 103 MMOL/L (ref 98–111)
CO2 SERPL-SCNC: 28 MMOL/L (ref 23–31)
CREAT SERPL-MCNC: 0.93 MG/DL (ref 0.55–1.02)
CREAT/UREA NIT SERPL: 35
EOSINOPHIL # BLD AUTO: 0.13 X10(3)/MCL (ref 0–0.9)
EOSINOPHIL NFR BLD AUTO: 4.3 %
ERYTHROCYTE [DISTWIDTH] IN BLOOD BY AUTOMATED COUNT: 13.5 % (ref 11.5–17)
GFR SERPLBLD CREATININE-BSD FMLA CKD-EPI: 59 MLS/MIN/1.73/M2
GLUCOSE SERPL-MCNC: 96 MG/DL (ref 75–121)
HCT VFR BLD AUTO: 37.6 % (ref 37–47)
HGB BLD-MCNC: 12.1 G/DL (ref 12–16)
IMM GRANULOCYTES # BLD AUTO: 0.01 X10(3)/MCL (ref 0–0.04)
IMM GRANULOCYTES NFR BLD AUTO: 0.3 %
LYMPHOCYTES # BLD AUTO: 1.1 X10(3)/MCL (ref 0.6–4.6)
LYMPHOCYTES NFR BLD AUTO: 36.4 %
MAGNESIUM SERPL-MCNC: 2 MG/DL (ref 1.6–2.6)
MCH RBC QN AUTO: 30.6 PG (ref 27–31)
MCHC RBC AUTO-ENTMCNC: 32.2 G/DL (ref 33–36)
MCV RBC AUTO: 94.9 FL (ref 80–94)
MONOCYTES # BLD AUTO: 0.44 X10(3)/MCL (ref 0.1–1.3)
MONOCYTES NFR BLD AUTO: 14.6 %
NEUTROPHILS # BLD AUTO: 1.32 X10(3)/MCL (ref 2.1–9.2)
NEUTROPHILS NFR BLD AUTO: 43.7 %
PHOSPHATE SERPL-MCNC: 3.3 MG/DL (ref 2.3–4.7)
PLATELET # BLD AUTO: 239 X10(3)/MCL (ref 130–400)
PMV BLD AUTO: 10 FL (ref 7.4–10.4)
POTASSIUM SERPL-SCNC: 4.5 MMOL/L (ref 3.5–5.1)
RBC # BLD AUTO: 3.96 X10(6)/MCL (ref 4.2–5.4)
SODIUM SERPL-SCNC: 138 MMOL/L (ref 132–146)
WBC # SPEC AUTO: 3.02 X10(3)/MCL (ref 4.5–11.5)

## 2023-09-12 PROCEDURE — 84100 ASSAY OF PHOSPHORUS: CPT | Performed by: INTERNAL MEDICINE

## 2023-09-12 PROCEDURE — 85730 THROMBOPLASTIN TIME PARTIAL: CPT | Performed by: INTERNAL MEDICINE

## 2023-09-12 PROCEDURE — 83735 ASSAY OF MAGNESIUM: CPT | Performed by: INTERNAL MEDICINE

## 2023-09-12 PROCEDURE — 80048 BASIC METABOLIC PNL TOTAL CA: CPT | Performed by: INTERNAL MEDICINE

## 2023-09-12 PROCEDURE — 85025 COMPLETE CBC W/AUTO DIFF WBC: CPT | Performed by: INTERNAL MEDICINE

## 2023-09-12 PROCEDURE — 21400001 HC TELEMETRY ROOM

## 2023-09-12 PROCEDURE — 94761 N-INVAS EAR/PLS OXIMETRY MLT: CPT

## 2023-09-12 PROCEDURE — 99223 1ST HOSP IP/OBS HIGH 75: CPT | Mod: ,,, | Performed by: INTERNAL MEDICINE

## 2023-09-12 PROCEDURE — 94799 UNLISTED PULMONARY SVC/PX: CPT

## 2023-09-12 PROCEDURE — 25000003 PHARM REV CODE 250: Performed by: INTERNAL MEDICINE

## 2023-09-12 PROCEDURE — 63600175 PHARM REV CODE 636 W HCPCS: Performed by: INTERNAL MEDICINE

## 2023-09-12 PROCEDURE — 99223 PR INITIAL HOSPITAL CARE,LEVL III: ICD-10-PCS | Mod: ,,, | Performed by: INTERNAL MEDICINE

## 2023-09-12 RX ADMIN — AMLODIPINE BESYLATE 5 MG: 5 TABLET ORAL at 09:09

## 2023-09-12 RX ADMIN — MULTIPLE VITAMINS W/ MINERALS TAB 1 TABLET: TAB at 09:09

## 2023-09-12 RX ADMIN — Medication 1 TABLET: at 05:09

## 2023-09-12 RX ADMIN — PANTOPRAZOLE SODIUM 40 MG: 40 TABLET, DELAYED RELEASE ORAL at 09:09

## 2023-09-12 RX ADMIN — VENLAFAXINE HYDROCHLORIDE 150 MG: 150 CAPSULE, EXTENDED RELEASE ORAL at 09:09

## 2023-09-12 RX ADMIN — DOCUSATE SODIUM 100 MG: 100 CAPSULE, LIQUID FILLED ORAL at 09:09

## 2023-09-12 RX ADMIN — Medication 1 TABLET: at 06:09

## 2023-09-12 RX ADMIN — ACETAMINOPHEN 650 MG: 325 TABLET, FILM COATED ORAL at 09:09

## 2023-09-12 RX ADMIN — ARIPIPRAZOLE 5 MG: 5 TABLET ORAL at 09:09

## 2023-09-12 RX ADMIN — CLONAZEPAM 0.5 MG: 0.5 TABLET ORAL at 09:09

## 2023-09-12 RX ADMIN — FENOFIBRATE 145 MG: 145 TABLET, COATED ORAL at 09:09

## 2023-09-12 RX ADMIN — FOLIC ACID 1 MG: 1 TABLET ORAL at 09:09

## 2023-09-12 RX ADMIN — ASPIRIN 81 MG: 81 TABLET, COATED ORAL at 09:09

## 2023-09-12 RX ADMIN — HEPARIN SODIUM 10 UNITS/KG/HR: 10000 INJECTION, SOLUTION INTRAVENOUS at 07:09

## 2023-09-12 RX ADMIN — DOCUSATE SODIUM 200 MG: 100 CAPSULE, LIQUID FILLED ORAL at 09:09

## 2023-09-12 NOTE — CONSULTS
Connoted DR Pierson Deal they are no taking pediatric patient,and doesn't know who does. [facility] - Oncology Acute  Hematology/Oncology  Progress Note      Consult Requested By: Virgil Mcknight MD    Reason for Consult:     SUBJECTIVE:     History of Present Illness:  Patient is a 89 y.o. female presents with Atypical chest pain.  EKG showed right bundle branch block.   Cardiac workup was essentially negative.  CT PE protocol revealed bilateral pulmonary emboli involving segmental and subsegmental pulmonary arteries.  No evidence of pulmonary infarct or heart strain.   NIVA study of the lower extremity with No evidence of deep venous thrombosis in either lower extremity.    Patient lives in assisted living reports that is relatively active.    Patient was started on heparin and we were consulted for further recommendations.  Patient is seen in rounds this afternoon.  Nurse and daughter at bedside.  She voices no concerns.    Continuous Infusions:   heparin (porcine) in D5W 10 Units/kg/hr (23 1100)     Scheduled Meds:   amLODIPine  5 mg Oral Daily    ARIPiprazole  5 mg Oral QHS    aspirin  81 mg Oral Daily    calcium-vitamin D3  1 tablet Oral BID WM    clonazePAM  0.5 mg Oral QHS    docusate sodium  100 mg Oral QHS    docusate sodium  200 mg Oral Daily    ergocalciferol  50,000 Units Oral Q14 Days    fenofibrate  145 mg Oral QHS    folic acid  1 mg Oral Daily    multivitamin  1 tablet Oral Daily    pantoprazole  40 mg Oral BID    venlafaxine  150 mg Oral Daily     PRN Meds:acetaminophen, heparin (PORCINE), heparin (PORCINE), hydrALAZINE, meclizine, melatonin, sodium chloride 0.9%    PAST MEDICAL HISTORY:  arthritis, depression, gastroesophageal reflux disease, dyslipidemia, constipation.   PAST SURGICAL HISTORY:  Just pertinent for bladder suspension.   SOCIAL HISTORY:  Not a smoker.  Did not drink.  She and her  owned a   rental business.  She is a  now and then now a resident of assisted living.  FAMILY HISTORY:  mother dying in her 70s and father also  in   his  80s.  She had 8 brothers and sister also, of which only 1 sister is alive   who is older to her and is 90.  She had 2 brothers who passed away and 4 sisters   who passed away.  She herself has 4 girls or daughters.     ALLERGIES:  NO KNOWN DRUG ALLERGIES.     No past medical history on file.  No past surgical history on file.  No family history on file.       Review of patient's allergies indicates:  No Known Allergies   No current facility-administered medications on file prior to encounter.     Current Outpatient Medications on File Prior to Encounter   Medication Sig Dispense Refill    amLODIPine (NORVASC) 5 MG tablet Take 5 mg by mouth once daily.      ARIPiprazole (ABILIFY) 5 MG Tab Take 5 mg by mouth every evening.      aspirin (ECOTRIN) 81 MG EC tablet Take 81 mg by mouth once daily.      calcium-vitamin D3 (CALCIUM 500 + D) 500 mg-5 mcg (200 unit) per tablet Take 1 tablet by mouth 2 (two) times daily with meals.      clonazePAM (KLONOPIN) 0.5 MG tablet Take 0.25 mg by mouth every evening.      colesevelam (WELCHOL) 625 mg tablet Take 625 mg by mouth before dinner.      cyanocobalamin (VITAMIN B-12) 250 MCG tablet Take 250 mcg by mouth once daily.      docusate sodium (COLACE) 100 MG capsule Take 100 mg by mouth every evening.      docusate sodium (COLACE) 100 MG capsule Take 200 mg by mouth once daily.      ergocalciferol (VITAMIN D2) 50,000 unit Cap Take 50,000 Units by mouth every 14 (fourteen) days. Every other week on Fridays      fenofibrate (TRICOR) 145 MG tablet Take 145 mg by mouth every evening.      folic acid (FOLVITE) 1 MG tablet Take 1 mg by mouth once daily.      glucosamine-chondroitin 500-400 mg tablet Take 1 tablet by mouth 2 (two) times daily.      iron-vit c-b12-folic acid (ICAR-C PLUS) Tab Take 1 tablet by mouth daily with breakfast.      multivitamin (THERAGRAN) per tablet Take 1 tablet by mouth once daily.      omega-3 fatty acids/fish oil (FISH OIL-OMEGA-3 FATTY ACIDS) 300-1,000 mg  capsule Take 1 capsule by mouth 3 (three) times daily.      pantoprazole (PROTONIX) 40 MG tablet Take 40 mg by mouth 2 (two) times daily.      venlafaxine (EFFEXOR-XR) 150 MG Cp24 Take 150 mg by mouth once daily.      meclizine (ANTIVERT) 50 MG tablet Take 12.5 mg by mouth every 12 (twelve) hours as needed.         Review of Systems   Constitutional:  Negative for activity change, appetite change, chills, fatigue, fever and unexpected weight change.   HENT:  Negative for mouth dryness, mouth sores, nosebleeds, sore throat and trouble swallowing.    Eyes:  Negative for visual disturbance.   Respiratory:  Negative for cough and shortness of breath.    Cardiovascular:  Negative for chest pain, palpitations and leg swelling.   Gastrointestinal:  Negative for abdominal distention, abdominal pain, blood in stool, change in bowel habit, constipation, diarrhea, nausea, vomiting and change in bowel habit.   Endocrine: Negative.    Genitourinary:  Negative for dysuria, frequency, hematuria and urgency.   Musculoskeletal:  Negative for arthralgias, back pain, myalgias and neck pain.   Integumentary:  Negative for rash.   Neurological:  Positive for weakness. Negative for dizziness, tremors, syncope, speech difficulty, light-headedness, numbness, headaches and memory loss.   Hematological:  Does not bruise/bleed easily.   Psychiatric/Behavioral:  Negative for confusion and suicidal ideas.          OBJECTIVE:     Vital Signs (Most Recent)  Temp: 97.7 °F (36.5 °C) (09/12/23 1151)  Pulse: 61 (09/12/23 1151)  Resp: 20 (09/12/23 1151)  BP: 135/82 (09/12/23 1151)  SpO2: 96 % (09/12/23 0824)    Pain Assessment: No pain reported at this time    Vital Signs Range (Last 24H):  Temp:  [97.7 °F (36.5 °C)-98.3 °F (36.8 °C)]   Pulse:  [61-69]   Resp:  [20]   BP: (122-135)/(60-82)   SpO2:  [94 %-98 %]     Physical Exam:  Physical Exam  Vitals and nursing note reviewed.   Constitutional:       General: She is not in acute distress.      Comments: Frail looking elderly woman   HENT:      Head: Normocephalic and atraumatic.      Mouth/Throat:      Mouth: Mucous membranes are moist.   Eyes:      General: No scleral icterus.     Extraocular Movements: Extraocular movements intact.      Conjunctiva/sclera: Conjunctivae normal.      Pupils: Pupils are equal, round, and reactive to light.   Neck:      Vascular: No JVD.   Cardiovascular:      Rate and Rhythm: Normal rate and regular rhythm.      Heart sounds: No murmur heard.  Pulmonary:      Effort: Pulmonary effort is normal.      Breath sounds: Normal breath sounds. No wheezing or rhonchi.   Abdominal:      General: Bowel sounds are normal. There is no distension.      Palpations: Abdomen is soft. There is no mass.      Tenderness: There is no abdominal tenderness.   Musculoskeletal:         General: No swelling or deformity.      Cervical back: Neck supple.   Lymphadenopathy:      Head:      Right side of head: No submandibular adenopathy.      Left side of head: No submandibular adenopathy.      Cervical: No cervical adenopathy.      Upper Body:      Right upper body: No supraclavicular or axillary adenopathy.      Left upper body: No supraclavicular or axillary adenopathy.      Lower Body: No right inguinal adenopathy. No left inguinal adenopathy.   Skin:     General: Skin is warm.      Coloration: Skin is not jaundiced.      Findings: No lesion or rash.      Nails: There is no clubbing.   Neurological:      General: No focal deficit present.      Mental Status: She is alert and oriented to person, place, and time.      Cranial Nerves: Cranial nerves 2-12 are intact.   Psychiatric:         Attention and Perception: Attention normal.         Behavior: Behavior is cooperative.         Judgment: Judgment normal.         Laboratory:  CBC with Differential:  Recent Labs   Lab 09/12/23  0429   WBC 3.02*   RBC 3.96*   HCT 37.6   HGB 12.1   MCV 94.9*   MCH 30.6   RDW 13.5      MPV 10.0  "    CMP:  Recent Labs   Lab 09/12/23  0429   CALCIUM 9.8      K 4.5   CO2 28   BUN 33.0*   CREATININE 0.93     BMP:   Recent Labs   Lab 09/12/23  0429   CALCIUM 9.8      K 4.5   CO2 28   BUN 33.0*   CREATININE 0.93     LFTs: No results for input(s): "ALT", "AST", "ALKPHOS", "BILITOT", "PROT", "ALBUMIN" in the last 24 hours.  Haptoglobin: No results for input(s): "HAPTOGLOBIN" in the last 24 hours.  Tumor Markers: No results for input(s): "PSA", "CEA", "", "AFPTM", "HE1496", "" in the last 24 hours.    Invalid input(s): "ALGTM"  Immunology: No results for input(s): "SPEP", "RADHA", "ALFIE", "FREELAMBDALI" in the last 24 hours.  Coagulation: No results for input(s): "PT", "INR", "APTT" in the last 24 hours.  Specimen (24h ago, onward)      None          Microbiology Results (last 7 days)       ** No results found for the last 168 hours. **            Diagnostic Results:  Imaging Results              CTA Chest Non-Coronary (PE Studies) (Final result)  Result time 09/10/23 09:48:19      Final result by Brenton Lamar MD (09/10/23 09:48:19)                   Impression:      Bilateral pulmonary emboli involving segmental and subsegmental pulmonary arteries.  No evidence for pulmonary infarct or heart strain.      Electronically signed by: Brenton Lamar MD  Date:    09/10/2023  Time:    09:48               Narrative:    EXAMINATION:  CTA CHEST NON CORONARY (PE STUDIES)    CLINICAL HISTORY:  chest pain radiating to back, SOB;    TECHNIQUE:  Multiple cross-section obtained from the lung apices to the mid abdomen after the intravenous administration 100 mL of Isovue 370.  Coronal and sagittal reformatted images were obtained.  An automated dose exposure technique was utilized this limits radiation does the patient.  MIP images were obtained.    COMPARISON:  09/09/2023    FINDINGS:  Heart size enlarged.  No evidence for reflux of contrast in the IVC.  No shift of the interventricular septum.  The course and " caliber of the thoracic aorta is normal.  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  Atheromatous disease is noted.  The main pulmonary artery is normal caliber and is adequately opacified.  Subsegmental pulmonary emboli are identified within the right upper and lower lobe as well as left lower lobe and left upper lobe.  No evidence for hilar or mediastinal adenopathy.    Adequate inflation lungs with dependent atelectatic changes.  Developing patchy airspace opacities within the left lung base.  No evidence for consolidation or pulmonary infarct.  No pleural thickening or pleural effusion.  The trachea and airway are patent.    The visualized hollow and solid viscera of the upper abdomen are grossly normal.  Spondylotic changes are identified.  No suspicious osseous lesions.  Soft tissues are grossly normal.                                       X-Ray Chest 1 View (Final result)  Result time 09/09/23 16:24:40      Final result by Brenton Méndez MD (09/09/23 16:24:40)                   Impression:      No acute cardiopulmonary abnormality.      Electronically signed by: Brenton Méndez MD  Date:    09/09/2023  Time:    16:24               Narrative:    EXAMINATION:  Single view chest radiograph.    CLINICAL HISTORY:  chest pain;    TECHNIQUE:  Single view of the chest.    COMPARISON:  None.    FINDINGS:  The lungs are clear without consolidation or effusion.  There is no pneumothorax.  The cardiac silhouette is normal in size.  There is no acute osseous abnormality.                                          ASSESSMENT/PLAN:     Patient Active Problem List   Diagnosis    Malignant hypertension    Pulmonary embolism    Chest pain     89 years old female that presented with a typical pain and found out to have bilateral pulmonary embolism.  I agree with Dr. Mcknight and at this time benefits of anticoagulation outweigh the risks.    Plan    Okay to transition to oral anticoagulation.  I think Eliquis would be a good option for  her.   As mention above even though patient is 89 years old and recess folate high benefit of anticoagulation outweighed the Risks.  Rec to anticoagulate for at least 3 months regardless hypercoagulable state w/u results  Agree with hypercoagulable state workup-pending     Thank you for the consult   Will be available for any questions     Li Craven MD  Hematology/Oncology  [facility]

## 2023-09-12 NOTE — PROGRESS NOTES
[facility]                     [address] [facility] [address]    PATIENT NAME:       RICHARD RG   YOB: 1933  CSN:                [id number]   MRN:                [id number]  ADMIT DATE:         09/09/2023 15:46:00  PHYSICIAN:          Virgil Mcknight MD                            PROGRESS NOTE    DATE:      CODE STATUS:  Full code.    SUBJECTIVE:  The patient was admitted for atypical chest pain.  Cardiac workup   was negative.  EKG showed right bundle-branch block.  The patient was found to   have multiple PE.  Each lobe of the lung had a pulmonary embolus.  Thrombophilia   or prothrombotic workup has been started.  I had a telephone call with Dr. Craven today, whom I have consulted for this atypical presentation in a lady   who was hemodynamically stable, had just pain without hypoxia or tachypnea or   hemodynamic instability, but did have pulmonary embolus on each of the lobe of   the lung.  The daughter was present in the room today.  I explained to her the   possible causes including the occult malignancy, but the blood work does not   guide us anywhere.  Her blood work is very robust and very healthy and we will   see what Dr. Craven has to say in her consult.  In the meanwhile, the patient   is on heparin drip and most likely within couple of days, we are going to   transition her to new oral anticoagulation, which probably she is going to   continue for life.  Of course at 89, she is at risk of fall, but right now the   benefits completely outweigh any risks pose by the chronic per long-term   anticoagulation.    OBJECTIVE:  VITAL SIGNS:  As follows; 97.8 temperature, 98% O2 sat on room air,   130/60 is her blood pressure, 67 is pulse.  HEENT:  Head normocephalic.  Pupils bilaterally react to light and equal in   size.  Mild conjunctival pallor.  No scleral icterus.  NECK:  Trachea is in  midline.  CHEST:  Good bilateral air entry.  CVS:  First and second heart sounds are heard normally without any added   murmurs.  ABDOMEN: Soft and nontender.  EXTREMITIES:  Devoid of edema, cyanosis, or clubbing.    LABS AND INVESTIGATIONS:  As follows; echocardiogram shows left ventricle is   normal in size, normal wall thickness, normal wall motion.  There is   hyperdynamic systolic function.  Ejection fraction 70%.  Right ventricular   normal size cavity.  Aortic valve, the aortic valve was a trileaflet wall.  The   bilateral carotid ultrasound, no significant localized arthrosclerotic plaque   formation or focal stenosis was visualized.  CAT scan of the head without   contrast shows no acute intracranial abnormality, generalized cerebellar or   cerebral atrophy, intracranial atherosclerosis, scattered hyperdensity of the   periventricular white matter suspicious for small-vessel ischemic disease.    Chest x-ray yesterday was negative.    WBC 3.29, RBC 4.16, hemoglobin 12.6, hematocrit 38.8, MCV 93.3, platelet count   is adequate at 251.  PTT is 90.5.  The patient is on heparin drip.  Sodium 136,   potassium 4.4, chloride 100, bicarb 30, CO2 of 6.0, BUN 28, creatinine 0.86, GFR   of more than 33.  There is other hematological workup, which is awaited in the   form of protein S, protein C deficiency, Factor V Leiden.  Those are all sent   out and will be waiting for the results including the lupus panel.    IMPRESSION:    1. Multiple pulmonary emboli with atypical chest pain without any hemodynamic   instability or hypoxia.  Workup till now negative for any cause.  2. History of hypertension.  3. History of dyslipidemia.  4. History of anxiety.    PLAN:  Continue the present line of treatment.  I have placed a consult for   Hematology/Oncology.  Dr. Craven had a talk with me with telephone.  We are   going to definitely see what will be her recommendations tomorrow after she sees   the patient.   In the  meanwhile, heparin drip for anticoagulation continue.    Overlapping for oral anticoagulation will be done in a couple of days after the   consult has been done.  The patient might need a transesophageal echocardiogram   and a Cardiology consult for placement of an event monitor or loop recorder.    Pleasure taking care of Ms. Minnie Barcenas during her stay at [facility] on the 3rd floor.        ______________________________  Virgil Mcknight MD    SS/JAYASHREE  DD:  09/11/2023  Time:  08:55PM  DT:  09/11/2023  Time:  10:30PM  Job #:  [id number]      PROGRESS NOTE

## 2023-09-13 PROBLEM — R07.9 CHEST PAIN: Status: RESOLVED | Noted: 2023-09-09 | Resolved: 2023-09-13

## 2023-09-13 PROBLEM — I10 MALIGNANT HYPERTENSION: Status: RESOLVED | Noted: 2023-09-09 | Resolved: 2023-09-13

## 2023-09-13 LAB
ANION GAP SERPL CALC-SCNC: 7 MEQ/L
BASOPHILS # BLD AUTO: 0.03 X10(3)/MCL
BASOPHILS NFR BLD AUTO: 0.9 %
BUN SERPL-MCNC: 34 MG/DL (ref 9.8–20.1)
CALCIUM SERPL-MCNC: 9.7 MG/DL (ref 8.4–10.2)
CHLORIDE SERPL-SCNC: 104 MMOL/L (ref 98–111)
CO2 SERPL-SCNC: 28 MMOL/L (ref 23–31)
CREAT SERPL-MCNC: 0.8 MG/DL (ref 0.55–1.02)
CREAT/UREA NIT SERPL: 43
EOSINOPHIL # BLD AUTO: 0.14 X10(3)/MCL (ref 0–0.9)
EOSINOPHIL NFR BLD AUTO: 4.1 %
ERYTHROCYTE [DISTWIDTH] IN BLOOD BY AUTOMATED COUNT: 13.6 % (ref 11.5–17)
GFR SERPLBLD CREATININE-BSD FMLA CKD-EPI: >60 MLS/MIN/1.73/M2
GLUCOSE SERPL-MCNC: 94 MG/DL (ref 75–121)
HCT VFR BLD AUTO: 37.7 % (ref 37–47)
HGB BLD-MCNC: 12.1 G/DL (ref 12–16)
IMM GRANULOCYTES # BLD AUTO: 0.01 X10(3)/MCL (ref 0–0.04)
IMM GRANULOCYTES NFR BLD AUTO: 0.3 %
LYMPHOCYTES # BLD AUTO: 1.19 X10(3)/MCL (ref 0.6–4.6)
LYMPHOCYTES NFR BLD AUTO: 34.8 %
MAGNESIUM SERPL-MCNC: 2 MG/DL (ref 1.6–2.6)
MCH RBC QN AUTO: 30.2 PG (ref 27–31)
MCHC RBC AUTO-ENTMCNC: 32.1 G/DL (ref 33–36)
MCV RBC AUTO: 94 FL (ref 80–94)
MONOCYTES # BLD AUTO: 0.61 X10(3)/MCL (ref 0.1–1.3)
MONOCYTES NFR BLD AUTO: 17.8 %
NEUTROPHILS # BLD AUTO: 1.44 X10(3)/MCL (ref 2.1–9.2)
NEUTROPHILS NFR BLD AUTO: 42.1 %
PHOSPHATE SERPL-MCNC: 3.1 MG/DL (ref 2.3–4.7)
PLATELET # BLD AUTO: 239 X10(3)/MCL (ref 130–400)
PMV BLD AUTO: 10.1 FL (ref 7.4–10.4)
POTASSIUM SERPL-SCNC: 4.4 MMOL/L (ref 3.5–5.1)
PROT S FREE AG ACT/NOR PPP IA: 86 % (ref 65–160)
RBC # BLD AUTO: 4.01 X10(6)/MCL (ref 4.2–5.4)
SODIUM SERPL-SCNC: 139 MMOL/L (ref 132–146)
WBC # SPEC AUTO: 3.42 X10(3)/MCL (ref 4.5–11.5)

## 2023-09-13 PROCEDURE — 21400001 HC TELEMETRY ROOM

## 2023-09-13 PROCEDURE — 83735 ASSAY OF MAGNESIUM: CPT | Performed by: INTERNAL MEDICINE

## 2023-09-13 PROCEDURE — 80048 BASIC METABOLIC PNL TOTAL CA: CPT | Performed by: INTERNAL MEDICINE

## 2023-09-13 PROCEDURE — 94761 N-INVAS EAR/PLS OXIMETRY MLT: CPT

## 2023-09-13 PROCEDURE — 84100 ASSAY OF PHOSPHORUS: CPT | Performed by: INTERNAL MEDICINE

## 2023-09-13 PROCEDURE — 97116 GAIT TRAINING THERAPY: CPT

## 2023-09-13 PROCEDURE — 85025 COMPLETE CBC W/AUTO DIFF WBC: CPT | Performed by: INTERNAL MEDICINE

## 2023-09-13 PROCEDURE — 99900035 HC TECH TIME PER 15 MIN (STAT)

## 2023-09-13 PROCEDURE — 94799 UNLISTED PULMONARY SVC/PX: CPT

## 2023-09-13 PROCEDURE — 25000003 PHARM REV CODE 250: Performed by: INTERNAL MEDICINE

## 2023-09-13 PROCEDURE — 97161 PT EVAL LOW COMPLEX 20 MIN: CPT

## 2023-09-13 RX ADMIN — DOCUSATE SODIUM 200 MG: 100 CAPSULE, LIQUID FILLED ORAL at 09:09

## 2023-09-13 RX ADMIN — PANTOPRAZOLE SODIUM 40 MG: 40 TABLET, DELAYED RELEASE ORAL at 09:09

## 2023-09-13 RX ADMIN — MULTIPLE VITAMINS W/ MINERALS TAB 1 TABLET: TAB at 09:09

## 2023-09-13 RX ADMIN — ASPIRIN 81 MG: 81 TABLET, COATED ORAL at 09:09

## 2023-09-13 RX ADMIN — DOCUSATE SODIUM 100 MG: 100 CAPSULE, LIQUID FILLED ORAL at 09:09

## 2023-09-13 RX ADMIN — VENLAFAXINE HYDROCHLORIDE 150 MG: 150 CAPSULE, EXTENDED RELEASE ORAL at 09:09

## 2023-09-13 RX ADMIN — APIXABAN 10 MG: 5 TABLET, FILM COATED ORAL at 09:09

## 2023-09-13 RX ADMIN — Medication 1 TABLET: at 06:09

## 2023-09-13 RX ADMIN — FOLIC ACID 1 MG: 1 TABLET ORAL at 09:09

## 2023-09-13 RX ADMIN — APIXABAN 10 MG: 5 TABLET, FILM COATED ORAL at 04:09

## 2023-09-13 RX ADMIN — Medication 1 TABLET: at 05:09

## 2023-09-13 RX ADMIN — FENOFIBRATE 145 MG: 145 TABLET, COATED ORAL at 09:09

## 2023-09-13 RX ADMIN — ACETAMINOPHEN 650 MG: 325 TABLET, FILM COATED ORAL at 09:09

## 2023-09-13 RX ADMIN — ARIPIPRAZOLE 5 MG: 5 TABLET ORAL at 09:09

## 2023-09-13 RX ADMIN — CLONAZEPAM 0.5 MG: 0.5 TABLET ORAL at 09:09

## 2023-09-13 RX ADMIN — AMLODIPINE BESYLATE 5 MG: 5 TABLET ORAL at 09:09

## 2023-09-13 NOTE — PT/OT/SLP PROGRESS
Physical Therapy      Patient Name:  Minnie Barcenas   MRN:  [id number]    Patient not seen today secondary to pt back in bed, reports she is exhausted . Will follow-up tomorrow.

## 2023-09-13 NOTE — PROGRESS NOTES
[facility]                     [address] [address]    PATIENT NAME:       RICHARD RG   YOB: 1933  CSN:                [id number]   MRN:                [id number]  ADMIT DATE:         09/09/2023 15:46:00  PHYSICIAN:          Virgil Mcknight MD                            PROGRESS NOTE    DATE:      CODE STATUS:  Full Code.    SUBJECTIVE:  The patient was admitted for atypical chest pain, was found to have   bilateral pulmonary embolus, started on heparin drip.  I have submitted the   anti-thrombophilia workup and consulted Dr. Craven.  I thank Dr. Craven for   the prompt consult.  I have gone through her consult, noted the recommendations.    She agreed with our line of treatment.  I am going to discontinue the heparin   drip tomorrow and start her on apixaban.  The daughter was worried about the   price and we will negotiate that when that obstacle comes.  Otherwise, the   patient is doing well.  She is weak so we have started the physical therapy   today.  She might require going to the assisted living with physical therapy in   place and we will see how she does with the physical therapy tomorrow.    OBJECTIVE:  VITAL SIGNS:  Stable, 150/66 blood pressure, 61 pulse, temperature   97.8, O2 sat 99%, afebrile.  HEENT:  Head normocephalic.  Pupils bilaterally react to light and equal in   size.  Mild conjunctival pallor.  No scleral icterus.  NECK:  Trachea is midline.  CHEST:  Good bilateral air entry.  CVS:  First and second heart sounds are heard normally without any murmurs.  ABDOMEN:  Soft and nontender.  EXTREMITIES:  Devoid of edema, cyanosis or clubbing.    LABORATORY AND X-RAY FINDINGS:  WBC 3.02, hemoglobin 12.1, hematocrit 37.6,   platelet count 239.  PTT 63.8.  Sodium 138, potassium 4.5, chloride 103, bicarb   28, BUN 33, creatinine 0.93, GFR of 59.    IMPRESSION:    1. Atypical chest pain  with investigation revealing bilateral pulmonary embolus   without any hypoxemia, hemodynamic instability status post consult with   Hematology.  Recommendations follows.  Oral conversion to anticoagulation in the   form of Eliquis will be done tomorrow.  2. Generalized weakness.  Continue with the physical therapy.  3. History of hypertension, history of depression, history of anxiety and   history of dyslipidemia.    PLAN:  Continue the present treatment.  Once we stop the anticoagulation   tomorrow with heparin, we will immediately start the patient on Eliquis 10 mg   b.i.d. for one week and then convert to 5 mg b.i.d.  If it is expensive as per   the daughter's apprehension, we will change it to either Xarelto or Pradaxa.    The dosing will be different and will be decided at that time which medication   is covered.  If needed, we will do a prior authorization.  We will also see how   the patient does with the physical therapy.    Pleasure taking care of Ms. Minnie Barcenas, during her stay at [facility] on the 3rd floor.        ______________________________  Virgil Mcknight MD    SS/AQS  DD:  09/12/2023  Time:  08:15PM  DT:  09/12/2023  Time:  10:08PM  Job #:  [id number]      PROGRESS NOTE

## 2023-09-13 NOTE — PT/OT/SLP EVAL
Physical Therapy Evaluation    Patient Name:  Minnie Barcenas   MRN:  [id number]    Recommendations:     Discharge Recommendations: assisted living facility   Discharge Equipment Recommendations: none   Barriers to discharge: None    Assessment:     Minnie Barcenas is a 89 y.o. female admitted with a medical diagnosis of Pulmonary embolism.  She presents with the following impairments/functional limitations:   weakness.    Rehab Prognosis: Good; patient would benefit from acute skilled PT services to address these deficits and reach maximum level of function.    Recent Surgery: * No surgery found *      Plan:     During this hospitalization, patient to be seen 5 x/week to address the identified rehab impairments via gait training, therapeutic activities, therapeutic exercises and progress toward the following goals:    Plan of Care Expires:       Subjective     Chief Complaint: weak legs, hasn't walked in a few days  Patient/Family Comments/goals: to return home  Pain/Comfort:  Pain Rating 1: 0/10    Patients cultural, spiritual, Church conflicts given the current situation:      Living Environment:  Assisted living  Prior to admission, patients level of function was I and ambulatory with a RW.  Equipment used at home: walker, rolling.  DME owned (not currently used): none.  Upon discharge, patient will have assistance from assisted living staff.    Objective:     Communicated with patient prior to session.  Patient found left sidelying with    upon PT entry to room.    General Precautions: Standard,    Orthopedic Precautions:    Braces:    Respiratory Status: Room air    Exams:  RLE Strength: WFL  LLE Strength: WFL    Functional Mobility:  Bed Mobility:     Supine to Sit: moderate assistance  Transfers:     Sit to Stand:  contact guard assistance with rolling walker  Gait: 200 ft RW CGA x 2 SBA      AM-PAC 6 CLICK MOBILITY  Total Score:12       Treatment & Education:  Edu on fall prevention with chair alarm  and call button in reach.    Patient left up in chair with all lines intact, call button in reach, and chair alarm on.    GOALS:   Multidisciplinary Problems       Physical Therapy Goals          Problem: Physical Therapy    Goal Priority Disciplines Outcome Goal Variances Interventions   Physical Therapy Goal     PT, PT/OT Ongoing, Progressing     Description: 1. Pt will be I with transfers  2. Pt will ambulate 300ft I with RW  3. Pt will tolerate sitting upright in chair for 2 hrs/day                       History:     No past medical history on file.    No past surgical history on file.    Time Tracking:     PT Received On:    PT Start Time: 1000     PT Stop Time: 1030  PT Total Time (min): 30 min     Billable Minutes: Evaluation 15 and Gait Training 15      09/13/2023

## 2023-09-13 NOTE — PLAN OF CARE
Per RT, daughter is asking if elequis can be changed to something cheaper.  I called her cell phone, she did not answer and left a message that she needs to check w/ MD about this.

## 2023-09-14 VITALS
WEIGHT: 123.44 LBS | DIASTOLIC BLOOD PRESSURE: 81 MMHG | SYSTOLIC BLOOD PRESSURE: 136 MMHG | TEMPERATURE: 99 F | HEIGHT: 63 IN | RESPIRATION RATE: 18 BRPM | HEART RATE: 76 BPM | BODY MASS INDEX: 21.87 KG/M2 | OXYGEN SATURATION: 93 %

## 2023-09-14 LAB
C3 SERPL-MCNC: 148 MG/DL
C4 SERPL-MCNC: 31 MG/DL
COAGULATION SPECIALIST REVIEW: NORMAL
F5 P.R506Q BLD/T QL: NEGATIVE
NUCLEAR IGG SER QL IA: DETECTED
PROVIDER SIGNING NAME: NORMAL
RHEUMATOID FACT SERPL-ACNC: 49 IU/ML
SARS-COV-2 RDRP RESP QL NAA+PROBE: NEGATIVE

## 2023-09-14 PROCEDURE — 87635 SARS-COV-2 COVID-19 AMP PRB: CPT | Performed by: INTERNAL MEDICINE

## 2023-09-14 PROCEDURE — 97530 THERAPEUTIC ACTIVITIES: CPT | Mod: CQ

## 2023-09-14 PROCEDURE — 94799 UNLISTED PULMONARY SVC/PX: CPT

## 2023-09-14 PROCEDURE — 97116 GAIT TRAINING THERAPY: CPT | Mod: CQ

## 2023-09-14 PROCEDURE — 94761 N-INVAS EAR/PLS OXIMETRY MLT: CPT

## 2023-09-14 PROCEDURE — 25000003 PHARM REV CODE 250: Performed by: INTERNAL MEDICINE

## 2023-09-14 RX ADMIN — FOLIC ACID 1 MG: 1 TABLET ORAL at 08:09

## 2023-09-14 RX ADMIN — VENLAFAXINE HYDROCHLORIDE 150 MG: 150 CAPSULE, EXTENDED RELEASE ORAL at 08:09

## 2023-09-14 RX ADMIN — APIXABAN 10 MG: 5 TABLET, FILM COATED ORAL at 08:09

## 2023-09-14 RX ADMIN — Medication 1 TABLET: at 08:09

## 2023-09-14 RX ADMIN — MULTIPLE VITAMINS W/ MINERALS TAB 1 TABLET: TAB at 08:09

## 2023-09-14 RX ADMIN — DOCUSATE SODIUM 200 MG: 100 CAPSULE, LIQUID FILLED ORAL at 08:09

## 2023-09-14 RX ADMIN — AMLODIPINE BESYLATE 5 MG: 5 TABLET ORAL at 08:09

## 2023-09-14 RX ADMIN — ASPIRIN 81 MG: 81 TABLET, COATED ORAL at 08:09

## 2023-09-14 RX ADMIN — PANTOPRAZOLE SODIUM 40 MG: 40 TABLET, DELAYED RELEASE ORAL at 08:09

## 2023-09-14 NOTE — PROGRESS NOTES
[facility]                     [address] [facility] [address]    PATIENT NAME:       RICHARD RG   YOB: 1933  CSN:                [id number]   MRN:                [id number]  ADMIT DATE:         09/09/2023 15:46:00  PHYSICIAN:          Virgil Mcknight MD                            PROGRESS NOTE    DATE:      SUBJECTIVE:  The patient was visited in the room and she is doing well.  She   says she did participate with the physical therapy.  She was admitted for   atypical chest pain, was found to have bilateral pulmonary embolus without any   hemodynamic instability or hypoxia.  Hematology has been consulted.    Recommendations as followed.  IV anticoagulation treatment.  Heparin has been   stopped.  Switch has been made to Eliquis at 10 mg b.i.d. for 1 week and then 5   mg b.i.d. to be continued at least for 3 months as per Hematology   recommendations.    OBJECTIVE:  VITAL SIGNS:  The patient's vitals are as follows: 96% O2 sat,   afebrile, 140/62 blood pressure, 83 pulse.  HEENT:  Head normocephalic. Pupils bilaterally react to light, equal size. Mild   conjunctival pallor. No scleral icterus. Trachea is in midline.  CHEST:  Good bilateral air entry. CVS:  First and second heart sounds heard   normally without any murmurs.  ABDOMEN:  Soft, nontender.   EXTREMITIES:  Devoid of edema, cyanosis, or clubbing.    LABS AND INVESTIGATIONS:  WBC 3.42, hemoglobin 12.1, hematocrit 37.1, MCV 94.2.    Sodium 139, potassium 4.4, chloride 104, bicarb 28, BUN 34, creatinine 0.80,   GFR of more than 60.      Echocardiogram shows ejection fraction of 70% to 75%.  CAT scan of the head   without contrast negative for any acute changes.    IMPRESSION:    1. Bilateral pulmonary embolus.  2. History of hypertension.  3. History of dyslipidemia.  4. Early cognitive decline.  CAT scan of the head negative for any acute   pathology.  5.  Generalized weakness.    PLAN:  Continue present line of treatment.  Eliquis to be continued.  The   patient will be discharged tomorrow with home health care to give the physical   therapy.  As per the daughter, the patient is weak.  Anticoagulation to continue   with Eliquis upon discharge 10 mg b.i.d. for 1 week and then 5 mg b.i.d. for at   least 3 months.  We will also arrange a followup with Dr. Craven, the   hem-oncologist.    Pleasure taking care of Ms. Swapna Hartman during her stay at [facility] on the 3rd floor.        ______________________________  MD JEAN CLAUDE Willingham/JAYASHREE  DD:  09/13/2023  Time:  09:26PM  DT:  09/13/2023  Time:  11:22PM  Job #:  [id number]      PROGRESS NOTE

## 2023-09-14 NOTE — PROGRESS NOTES
"Notified  that patient cannot follow-up with  at [facility] due to patient not being a patient of  at the cancer center. Notified  that patient will need a referral  to be seen at the cancer center. Dr. Mcknight stated " Have cancer center contact my clinic". Notified Nurse Olson at the cancer center to notified 's clinic. Nurse Olson stated " I will contact 's office". Notified JOHN Rivera at [facility] that 's office will send a referral to [facility] .   "

## 2023-09-14 NOTE — PLAN OF CARE
09/14/23 0834   Final Note   Assessment Type Final Discharge Note   Anticipated Discharge Disposition Home   Post-Acute Status   Post-Acute Authorization Home Health   Home Health Status Set-up Complete/Auth obtained   Discharge Delays None known at this time     Notified Assisted Living at [facility] that pt is d/c today.  Inquired about COVID testing.  They do not need a COVID test.  Faxed the AVS.  Arranged [facility] to see her there.  Daughter and pt agreed.  Facility van will pick her up.  They asked that nurse call nurse there and give a brief report.

## 2023-09-14 NOTE — PT/OT/SLP PROGRESS
Physical Therapy Treatment    Patient Name:  Minnie Barcenas   MRN:  [id number]    Recommendations:     Discharge Recommendations: assisted living facility  Discharge Equipment Recommendations: none  Barriers to discharge: None    Assessment:     Minnie Barcenas is a 89 y.o. female admitted with a medical diagnosis of Pulmonary embolism.  She presents with the following impairments/functional limitations: weakness, impaired endurance, gait instability, impaired balance.    Pt ambulated in hallway x 150' with RW and another 150' with HHA. She is doing well, mild fatigue following but recovered WNL.    Rehab Prognosis: Good; patient would benefit from acute skilled PT services to address these deficits and reach maximum level of function.    Recent Surgery: * No surgery found *      Plan:     During this hospitalization, patient to be seen 5 x/week to address the identified rehab impairments via gait training, therapeutic activities, therapeutic exercises and progress toward the following goals:    Plan of Care Expires:       Subjective     Chief Complaint: weakness  Patient/Family Comments/goals: to return to assisted living  Pain/Comfort:         Objective:     Communicated with patient prior to session.  Patient found HOB elevated with telemetry, PureWick upon PT entry to room.     General Precautions: Standard,    Orthopedic Precautions:    Braces:    Respiratory Status: Room air     Functional Mobility:  Bed Mobility:     Supine to Sit: contact guard assistance and minimum assistance  Transfers:     Sit to Stand:  minimum assistance with rolling walker  Gait: 150' with RW and 150' with HHA  Balance: min-CGA in supported standing      AM-PAC 6 CLICK MOBILITY          Treatment & Education:  See mobility above    Patient left up in chair with all lines intact, call button in reach, and chair alarm on..    GOALS:   Multidisciplinary Problems       Physical Therapy Goals       Not on file               Multidisciplinary Problems (Resolved)          Problem: Physical Therapy    Goal Priority Disciplines Outcome Goal Variances Interventions   Physical Therapy Goal   (Resolved)     PT, PT/OT Met     Description: 1. Pt will be I with transfers  2. Pt will ambulate 300ft I with RW  3. Pt will tolerate sitting upright in chair for 2 hrs/day                       Time Tracking:     PT Received On: 09/14/23  PT Start Time: 1015     PT Stop Time: 1045  PT Total Time (min): 30 min     Billable Minutes: Gait Training 15 and Therapeutic Activity 15    Treatment Type: Treatment  PT/PTA: PTA           09/14/2023

## 2023-09-14 NOTE — PLAN OF CARE
Problem: Adult Inpatient Plan of Care  Goal: Plan of Care Review  9/14/2023 0341 by Veronique Mauricio LPN  Outcome: Met  9/14/2023 0340 by Veronique Mauricio LPN  Outcome: Ongoing, Progressing  9/14/2023 0240 by Veronique Mauricio LPN  Outcome: Ongoing, Progressing  Goal: Patient-Specific Goal (Individualized)  9/14/2023 0341 by Veronique Mauricio LPN  Outcome: Met  9/14/2023 0340 by Veronique Mauricio LPN  Outcome: Ongoing, Progressing  9/14/2023 0240 by Veronique Mauricio LPN  Outcome: Ongoing, Progressing  Goal: Absence of Hospital-Acquired Illness or Injury  9/14/2023 0341 by Veronique Mauricio LPN  Outcome: Met  9/14/2023 0340 by Veronique Mauricio LPN  Outcome: Ongoing, Progressing  9/14/2023 0240 by Veronique Mauricio LPN  Outcome: Ongoing, Progressing  Goal: Optimal Comfort and Wellbeing  9/14/2023 0341 by Veronique Mauricio LPN  Outcome: Met  9/14/2023 0340 by Veronique Mauricio LPN  Outcome: Ongoing, Progressing  9/14/2023 0240 by Veronique Mauricio LPN  Outcome: Ongoing, Progressing  Goal: Readiness for Transition of Care  9/14/2023 0341 by Veronique Mauricio LPN  Outcome: Met  9/14/2023 0340 by Veronique Mauricio LPN  Outcome: Ongoing, Progressing  9/14/2023 0240 by Veronique Mauricio LPN  Outcome: Ongoing, Progressing     Problem: Fall Injury Risk  Goal: Absence of Fall and Fall-Related Injury  9/14/2023 0341 by Veronique Mauricio LPN  Outcome: Met  9/14/2023 0340 by Veronique Mauricio LPN  Outcome: Ongoing, Progressing  9/14/2023 0240 by Veronique Mauricio LPN  Outcome: Ongoing, Progressing     Problem: Hypertension Comorbidity  Goal: Blood Pressure in Desired Range  9/14/2023 0341 by Veronique Mauricio LPN  Outcome: Met  9/14/2023 0340 by Veronique Mauricio LPN  Outcome: Ongoing, Progressing  9/14/2023 0240 by Veronique Mauricio LPN  Outcome: Ongoing, Progressing     Problem: VTE (Venous Thromboembolism)  Goal: VTE (Venous Thromboembolism) Symptom Resolution  9/14/2023 0341 by Veronique Mauricio  LPN  Outcome: Met  9/14/2023 0340 by Veronique Mauricio LPN  Outcome: Ongoing, Progressing  9/14/2023 0240 by Veronique Mauricio LPN  Outcome: Ongoing, Progressing     Problem: Physical Therapy  Goal: Physical Therapy Goal  Description: 1. Pt will be I with transfers  2. Pt will ambulate 300ft I with RW  3. Pt will tolerate sitting upright in chair for 2 hrs/day  Outcome: Met

## 2023-09-15 LAB
AR ANA INTERPRETIVE COMMENT: NORMAL
AR ANTINUCLEAR ANTIBODY (ANA), HEP-2, IGG: NORMAL

## 2023-09-15 NOTE — DISCHARGE SUMMARY
[facility]                     [address] [address]    PATIENT NAME:       RICHARD RG   YOB: 1933  CSN:                [id number]   MRN:                [id number]  ADMIT DATE:         09/09/2023 15:46:00  PHYSICIAN:          Virgil Mcknight MD                          DISCHARGE SUMMARY    DATE OF DISCHARGE:  09/14/2023 13:50:00    DISCHARGE DIAGNOSES:    1. Atypical chest pain with shortness of breath and weakness, status post   Tele-Cardiology consult.  Radio imaging revealing multiple pulmonary emboli   without hypoxia or hemodynamic instability.  Patient treated initially with   heparin drip status post Heme-Oncology consult and being discharged on Eliquis   10 mg b.i.d. for 7 days and then to continue 5 mg b.i.d. at least for 3 months.  2. History of uncontrolled hypertension or accelerated hypertension, now   controlled.  Chest pain, atypical.  Any acute coronary artery syndrome ruled out   by negative cardiac enzyme and no progressive EKG changes.  History of   hypertension, history of osteoporosis, history of GERD, history of anxiety,   history of depression, history of constipation, possibility of confusion.  CAT   scans of the head negative.  Cause of the pulmonary emboli, most of the workup   negative including carotid ultrasound, transthoracic echocardiogram, as well as   ultrasound of the bilateral lower extremities and till now negative workup   serology for any thrombophilia workup.    HOSPITAL COURSE:  Ms. Swapna Hartman is a very pleasant 89-year-old female who   was in her usual state of health, started having some chest pain with shortness   of breath and was brought via the ambulance to the emergency room where EKG   shows right bundle branch block.  The cardiac enzymes were negative.    Tele-Cardiology consult was done.  Because of the atypical chest pain, CT of the   lung was done  for pulmonary embolism protocol and it showed that patient had   multiple embolus, each lobe of the lung had an embolus to it.  The patient was   hemodynamically stable.  She did not have any hypoxia, hypotension, or any other   signs of hemodynamic instability.  As a result, the patient was admitted on   telemetry, monitored closely for any arrhythmia.  Also we did carotid   ultrasound, which was negative.  Bilateral lower extremity venous ultrasound was   negative for any DVT and the echocardiogram did not show any dilatation of any   chamber and ejection fraction was more than 50%.  The hemophilia workup was   done, which still now was negative.  Heme-Oncology was consulted.  Dr. Craven   saw the patient and she agreed with our line of treatment.  She was   anticoagulated with heparin initially and later on it was changed to Eliquis.    We monitored the patient on Eliquis for a day or so and now the patient is being   discharged on Eliquis for the PE protocol treatment and maintenance.  During   the hospital stay, home medications were continued.  She also had accelerated   hypertension.  Hydralazine IV was used to bring the systolic blood pressure down   and then the home medications did not need any alterations.  Once the   medications were restarted, the patient's blood pressure came down nicely.  No   new medications were added other than the Eliquis.  The Eliquis was given to her   in the dose of 10 mg b.i.d. for 7 days with food and then she will continue   with 5 mg b.i.d. The patient is a 89-year-old, is at risk of falls, but the   benefits outweigh the risks and so she will be discharged on Eliquis that has   been explained to the patient's daughter and to the patient.  Also fall   precautions to be taken.  Because the patient had some confusion, a CAT scan of   the head was done.  It was negative for any acute CVA.  Also, the patient was   given physical therapy.  She will be going home with home  health giving her the   physical therapy and also educating about the Eliquis and the new entity of   pulmonary embolism, which I have explained to her amply in the hospital.    DISCHARGE MEDICATIONS:  The medications on which the patient is going are the   same as home medications.  They are:  1. Amlodipine 5 mg daily.  2. Abilify 5 mg daily.  3. Vitamin B12 250 mcg daily.  4. Calcium with vitamin D one tablet twice daily.  5. Clonazepam 0.5 mg half tablet q.h.s.  6. Questran 625 mg 1 tablet every day.  7. Cranberry 1 capsule twice daily.  8. Fenofibrate 145 mg daily.  9. Fish oil Omega-3 one capsule of 1000 mg 3 times a day.  10. Folic acid 1 mg daily.  11. Glucosamine chondroitin 1 capsule daily.  12. Icar Plus 1 tablet daily.  13. Multivitamin one tablet daily.  14. Pantoprazole 40 mg daily.  15. Stool softener that is Colace 100 mg 2 capsules in the morning, 1 capsule at   bedtime.  16. Effexor  mg in the morning.  17. Vitamin D 26503 units once a week on Friday.  18. Meclizine 12.5 mg p.r.n. for dizziness p.r.n.  19. Eliquis 10 mg b.i.d. for 5 days and then 5 mg b.i.d. with food for at least   3 months.    LABS AND INVESTIGATIONS:  Done during the hospital stay are as follows; WBC   3.42, hemoglobin 12.1, hematocrit 37.7, MCV 94.0, platelet count 239.  Sodium   139, potassium 4.4, chloride 104, bicarb 28, BUN 34, creatinine 0.80, glucose   94, magnesium 2.0, COVID test before discharge negative.  Echo shows left   ventricle is normal in size, normal wall motion.  There is hyperdynamic systolic   function, which is visually estimated to be 70% to 75%.  Right ventricle cavity   size is normal.  Systolic function is normal.  No significant valvular   pathology.  The CAT scan of the head without contrast shows no acute changes.    The CAT scan for PE protocol shows bilateral pulmonary emboli involving   segmental and subsegmental pulmonary arteries.  No evidence of pulmonary infarct   or heart strain.  TSH  4.3, hemoglobin A1c 5.0.  Lactic acid 1.0.  Cardiac   enzymes negative.  The thrombophilia workup till now is negative and few results   are awaited.  BNP was 54.7, PT/INR within normal limits.  Protein C antigen   test is awaited.    It was pleasure taking care of Ms. Minnie Barcenas during her stay at [facility].        ______________________________  Virgil Mcknight MD    SS/AQS  DD:  09/14/2023  Time:  08:41PM  DT:  09/14/2023  Time:  10:58PM  Job #:  [id number]    cc:   Assisted Living        Li Greer M.D.        [facility]        DISCHARGE SUMMARY

## 2023-10-09 NOTE — PROGRESS NOTES
HEMATOLOGY/ONCOLOGY OFFICE CLINIC VISIT    Visit Information:    Initial Evaluation: 9/12/2023 (Hospital Consult)  Referring Provider:   Other providers:  Code status:    Diagnosis:   Pulmonary Embolism    Present treatment:   Eliquis 5mg    Treatment/Oncology history:    Plan of care:     Imaging:    Pathology:      CLINICAL HISTORY:       Patient: Minnie Barcenas is a 89 y.o. female that I saw in consultation when she presented to the hospital with Atypical chest pain.  EKG showed right bundle branch block.   Cardiac workup was essentially negative.  CT PE protocol revealed bilateral pulmonary emboli involving segmental and subsegmental pulmonary arteries.  No evidence of pulmonary infarct or heart strain.   NIVA study of the lower extremity with No evidence of deep venous thrombosis in either lower extremity. Patient was started on heparin and d/c on Eliquis. So far she is tolerating well. No bleeding    Patient lives in assisted living [facility] and reports that is relatively active. Daughter is here with her. Patient is doing well and voices no concerns. She denies and abnormal bruising or bleeding. Denies SOB, chest pain, fever, chills.     Discuss blood work with the patient.  She does have ALFIE positive and rheumatoid factor positive.  Explained that this are autoimmune markers and we will need to be further evaluated by either primary care physician or rheumatologist.  Hypercoagulable state workup was essentially negative.        Chief Complaint: Other Misc (Pt reports no new concerns today.)      Interval History:      Past Medical History:   Diagnosis Date    Anxiety disorder, unspecified     Arthritis     Depression     GERD (gastroesophageal reflux disease)     Hypertension     Thyroid disease       History reviewed. No pertinent surgical history.  History reviewed. No pertinent family history.  Social Connections: Unknown (9/11/2023)    Social Connection and Isolation Panel [NHANES]     Frequency of  Communication with Friends and Family: Patient refused     Frequency of Social Gatherings with Friends and Family: Patient refused     Attends Alevism Services: Patient refused     Active Member of Clubs or Organizations: Patient refused     Attends Club or Organization Meetings: Patient refused     Marital Status: Patient refused       Review of patient's allergies indicates:   Allergen Reactions    Sulfa (sulfonamide antibiotics)       Current Outpatient Medications on File Prior to Visit   Medication Sig Dispense Refill    amLODIPine (NORVASC) 5 MG tablet Take 5 mg by mouth once daily.      apixaban (ELIQUIS) 5 mg Tab Take 1 tablet (5 mg total) by mouth 2 (two) times daily. 60 tablet 3    ARIPiprazole (ABILIFY) 5 MG Tab Take 5 mg by mouth every evening.      aspirin (ECOTRIN) 81 MG EC tablet Take 81 mg by mouth once daily.      calcium-vitamin D3 (CALCIUM 500 + D) 500 mg-5 mcg (200 unit) per tablet Take 1 tablet by mouth 2 (two) times daily with meals.      clonazePAM (KLONOPIN) 0.5 MG tablet Take 0.25 mg by mouth every evening.      colesevelam (WELCHOL) 625 mg tablet Take 625 mg by mouth before dinner.      cyanocobalamin (VITAMIN B-12) 250 MCG tablet Take 250 mcg by mouth once daily.      docusate sodium (COLACE) 100 MG capsule Take 100 mg by mouth every evening.      docusate sodium (COLACE) 100 MG capsule Take 200 mg by mouth once daily.      ergocalciferol (VITAMIN D2) 50,000 unit Cap Take 50,000 Units by mouth every 14 (fourteen) days. Every other week on Fridays      fenofibrate (TRICOR) 145 MG tablet Take 145 mg by mouth every evening.      folic acid (FOLVITE) 1 MG tablet Take 1 mg by mouth once daily.      glucosamine-chondroitin 500-400 mg tablet Take 1 tablet by mouth 2 (two) times daily.      iron-vit c-b12-folic acid (ICAR-C PLUS) Tab Take 1 tablet by mouth daily with breakfast.      meclizine (ANTIVERT) 50 MG tablet Take 12.5 mg by mouth every 12 (twelve) hours as needed.      multivitamin  "(THERAGRAN) per tablet Take 1 tablet by mouth once daily.      omega-3 fatty acids/fish oil (FISH OIL-OMEGA-3 FATTY ACIDS) 300-1,000 mg capsule Take 1 capsule by mouth 3 (three) times daily.      pantoprazole (PROTONIX) 40 MG tablet Take 40 mg by mouth 2 (two) times daily.      venlafaxine (EFFEXOR-XR) 150 MG Cp24 Take 150 mg by mouth once daily.       No current facility-administered medications on file prior to visit.      Review of Systems   Constitutional:  Negative for activity change, appetite change, chills, fatigue, fever and unexpected weight change.   HENT:  Negative for mouth dryness, mouth sores, nosebleeds, sore throat and trouble swallowing.    Eyes:  Negative for visual disturbance.   Respiratory:  Negative for cough and shortness of breath.    Cardiovascular:  Negative for chest pain, palpitations and leg swelling.   Gastrointestinal:  Negative for abdominal distention, abdominal pain, blood in stool, change in bowel habit, constipation, diarrhea, nausea and vomiting.   Endocrine: Negative.    Genitourinary:  Negative for dysuria, frequency, hematuria and urgency.   Musculoskeletal:  Negative for arthralgias, back pain, myalgias and neck pain.   Integumentary:  Negative for rash.   Neurological:  Negative for dizziness, tremors, syncope, speech difficulty, weakness, light-headedness, numbness, headaches and memory loss.   Hematological:  Does not bruise/bleed easily.   Psychiatric/Behavioral:  Negative for confusion and suicidal ideas.               Vitals:    10/10/23 1043   BP: (!) 143/75   BP Location: Right arm   Patient Position: Sitting   Pulse: 75   Resp: 18   Temp: 97.7 °F (36.5 °C)   TempSrc: Oral   SpO2: 98%   Weight: 58.4 kg (128 lb 12.8 oz)   Height: 5' 2" (1.575 m)      Wt Readings from Last 6 Encounters:   10/10/23 58.4 kg (128 lb 12.8 oz)   09/09/23 56 kg (123 lb 7.3 oz)   06/04/23 59 kg (130 lb)   01/08/23 59 kg (130 lb)   09/07/17 54.4 kg (119 lb 15.9 oz)     Body mass index is 23.56 " kg/m².  Body surface area is 1.6 meters squared.  Physical Exam  Vitals and nursing note reviewed.   Constitutional:       General: She is not in acute distress.     Appearance: Normal appearance. She is well-developed.      Comments: Elderly female   HENT:      Head: Normocephalic and atraumatic.      Mouth/Throat:      Mouth: Mucous membranes are moist.   Eyes:      General: No scleral icterus.     Extraocular Movements: Extraocular movements intact.      Conjunctiva/sclera: Conjunctivae normal.      Pupils: Pupils are equal, round, and reactive to light.   Neck:      Vascular: No JVD.   Cardiovascular:      Rate and Rhythm: Normal rate and regular rhythm.      Heart sounds: No murmur heard.  Pulmonary:      Effort: Pulmonary effort is normal.      Breath sounds: Normal breath sounds. No wheezing or rhonchi.   Abdominal:      General: Bowel sounds are normal. There is no distension.      Palpations: Abdomen is soft. There is no mass.      Tenderness: There is no abdominal tenderness.   Musculoskeletal:         General: No swelling or deformity.      Cervical back: Neck supple.   Lymphadenopathy:      Head:      Right side of head: No submandibular adenopathy.      Left side of head: No submandibular adenopathy.      Cervical: No cervical adenopathy.      Upper Body:      Right upper body: No supraclavicular or axillary adenopathy.      Left upper body: No supraclavicular or axillary adenopathy.      Lower Body: No right inguinal adenopathy. No left inguinal adenopathy.   Skin:     General: Skin is warm.      Coloration: Skin is not jaundiced.      Findings: No lesion or rash.      Nails: There is no clubbing.   Neurological:      General: No focal deficit present.      Mental Status: She is alert and oriented to person, place, and time.      Cranial Nerves: Cranial nerves 2-12 are intact.   Psychiatric:         Attention and Perception: Attention normal.         Behavior: Behavior is cooperative.         Judgment:  Judgment normal.         Laboratory:  CBC with Differential:  Lab Results   Component Value Date    WBC 3.42 (L) 09/13/2023    RBC 4.01 (L) 09/13/2023    HGB 12.1 09/13/2023    HCT 37.7 09/13/2023    MCV 94.0 09/13/2023    MCH 30.2 09/13/2023    MCHC 32.1 (L) 09/13/2023    RDW 13.6 09/13/2023     09/13/2023    MPV 10.1 09/13/2023        CMP:  Sodium Level   Date Value Ref Range Status   09/13/2023 139 132 - 146 mmol/L Final     Potassium Level   Date Value Ref Range Status   09/13/2023 4.4 3.5 - 5.1 mmol/L Final     Carbon Dioxide   Date Value Ref Range Status   09/13/2023 28 23 - 31 mmol/L Final     Blood Urea Nitrogen   Date Value Ref Range Status   09/13/2023 34.0 (H) 9.8 - 20.1 mg/dL Final     Creatinine   Date Value Ref Range Status   09/13/2023 0.80 0.55 - 1.02 mg/dL Final     Calcium Level Total   Date Value Ref Range Status   09/13/2023 9.7 8.4 - 10.2 mg/dL Final     Albumin Level   Date Value Ref Range Status   09/10/2023 3.3 (L) 3.4 - 4.8 g/dL Final     Bilirubin Total   Date Value Ref Range Status   09/10/2023 0.4 <=1.5 mg/dL Final     Alkaline Phosphatase   Date Value Ref Range Status   09/10/2023 42 40 - 150 unit/L Final     Aspartate Aminotransferase   Date Value Ref Range Status   09/10/2023 26 5 - 34 unit/L Final     Alanine Aminotransferase   Date Value Ref Range Status   09/10/2023 15 0 - 55 unit/L Final     Estimated GFR-Non    Date Value Ref Range Status   12/14/2017 37 mL/min/1.73 m2 Final             Assessment:       Pulmonary embolism- on Eliquis        Plan:       As mention above even though patient is 89 years old and need to evaluate risks vs benefits of anticoagulation.  I will recommend short course of anticoagulation for 3 months.  She can start baby aspirin after she completes her 3 month.  Recommend to stop Omega 3 supplement as this can  Thin her blood as well.  I will not complete hypercoagulable state workup as per daughter's request.  I agree  with her as  treatment recommendations will not change, therefore, will not complete the workup.  I explained to the patient that she needs to be very careful and avoid falls or accidents as she is on blood thinner and she can bleed profusely causing severe morbidity and death.  If patient start having falls then recommend to d/c Eliquis and continue with ASA. She verbalized understanding.      - Continue on Eliquis 5 mg BID for a total of 3 months (2 more months)  - Noted elevated rheumatoid factor will defer to PCP  - Stop ASA 81 mg and Omega-3 at this time  - follow up with PCP Dr. Mcknight  - patient with autoimmune markers that they were positive.  Will defer to Dr. Mcknight any further workup/referrals  - RTC PRN    The patient was seen, interviewed and examined. Pertinent lab and radiology studies were reviewed.   The patient was given ample opportunity to ask questions, and to the best of my abilities, all questions answered to satisfaction; patient demonstrated understanding of what we discussed and agreeable to the plan. Pt instructed to call should develop concerning signs/symptoms or have further questions.         Li Craven MD  Hematology/Oncology          Professional Services   I, Cecelia Pagan LPN, acted solely as a scribe for and in the presence of Dr. Li Craven, who performed these services.

## 2023-10-10 ENCOUNTER — APPOINTMENT (OUTPATIENT)
Dept: LAB | Facility: HOSPITAL | Age: 88
End: 2023-10-10
Attending: INTERNAL MEDICINE
Payer: MEDICARE

## 2023-10-10 ENCOUNTER — OFFICE VISIT (OUTPATIENT)
Dept: HEMATOLOGY/ONCOLOGY | Facility: CLINIC | Age: 88
End: 2023-10-10
Payer: MEDICARE

## 2023-10-10 VITALS
TEMPERATURE: 98 F | HEART RATE: 75 BPM | HEIGHT: 62 IN | OXYGEN SATURATION: 98 % | WEIGHT: 128.81 LBS | BODY MASS INDEX: 23.7 KG/M2 | RESPIRATION RATE: 18 BRPM | DIASTOLIC BLOOD PRESSURE: 75 MMHG | SYSTOLIC BLOOD PRESSURE: 143 MMHG

## 2023-10-10 DIAGNOSIS — I26.99 PULMONARY EMBOLISM, UNSPECIFIED CHRONICITY, UNSPECIFIED PULMONARY EMBOLISM TYPE, UNSPECIFIED WHETHER ACUTE COR PULMONALE PRESENT: Primary | ICD-10-CM

## 2023-10-10 PROCEDURE — 99214 OFFICE O/P EST MOD 30 MIN: CPT | Mod: S$PBB,,, | Performed by: INTERNAL MEDICINE

## 2023-10-10 PROCEDURE — 99215 OFFICE O/P EST HI 40 MIN: CPT | Mod: PBBFAC | Performed by: INTERNAL MEDICINE

## 2023-10-10 PROCEDURE — 99999 PR PBB SHADOW E&M-EST. PATIENT-LVL V: CPT | Mod: PBBFAC,,, | Performed by: INTERNAL MEDICINE

## 2023-10-10 PROCEDURE — 99214 PR OFFICE/OUTPT VISIT, EST, LEVL IV, 30-39 MIN: ICD-10-PCS | Mod: S$PBB,,, | Performed by: INTERNAL MEDICINE

## 2023-10-10 PROCEDURE — 99999 PR PBB SHADOW E&M-EST. PATIENT-LVL V: ICD-10-PCS | Mod: PBBFAC,,, | Performed by: INTERNAL MEDICINE

## 2023-12-18 PROBLEM — I26.99 PULMONARY EMBOLISM: Status: RESOLVED | Noted: 2023-09-09 | Resolved: 2023-12-18

## 2024-10-03 DIAGNOSIS — R55 SYNCOPE AND COLLAPSE: Primary | ICD-10-CM

## 2024-10-07 ENCOUNTER — HOSPITAL ENCOUNTER (OUTPATIENT)
Dept: RADIOLOGY | Facility: HOSPITAL | Age: 89
Discharge: HOME | End: 2024-10-07
Attending: INTERNAL MEDICINE
Payer: MEDICARE

## 2024-10-07 DIAGNOSIS — R55 SYNCOPE AND COLLAPSE: ICD-10-CM

## 2024-10-07 PROCEDURE — 70450 CT HEAD/BRAIN W/O DYE: CPT | Mod: TC

## 2025-01-05 ENCOUNTER — HOSPITAL ENCOUNTER (EMERGENCY)
Facility: HOSPITAL | Age: OVER 89
Discharge: HOME | End: 2025-01-05
Attending: EMERGENCY MEDICINE
Payer: MEDICARE

## 2025-01-05 VITALS
TEMPERATURE: 98 F | WEIGHT: 125 LBS | HEART RATE: 60 BPM | SYSTOLIC BLOOD PRESSURE: 150 MMHG | OXYGEN SATURATION: 98 % | RESPIRATION RATE: 18 BRPM | DIASTOLIC BLOOD PRESSURE: 62 MMHG | BODY MASS INDEX: 23.6 KG/M2 | HEIGHT: 61 IN

## 2025-01-05 DIAGNOSIS — R05.9 COUGH: ICD-10-CM

## 2025-01-05 DIAGNOSIS — J06.9 UPPER RESPIRATORY TRACT INFECTION, UNSPECIFIED TYPE: Primary | ICD-10-CM

## 2025-01-05 LAB
ALBUMIN SERPL-MCNC: 3.2 G/DL (ref 3.4–4.8)
ALBUMIN/GLOB SERPL: 1 RATIO (ref 1.1–2)
ALP SERPL-CCNC: 39 UNIT/L (ref 40–150)
ALT SERPL-CCNC: 15 UNIT/L (ref 0–55)
ANION GAP SERPL CALC-SCNC: 7 MEQ/L
AST SERPL-CCNC: 25 UNIT/L (ref 5–34)
BASOPHILS # BLD AUTO: 0.01 X10(3)/MCL
BASOPHILS NFR BLD AUTO: 0.3 %
BILIRUB SERPL-MCNC: 0.2 MG/DL
BILIRUB UR QL STRIP.AUTO: NEGATIVE
BUN SERPL-MCNC: 26 MG/DL (ref 9.8–20.1)
CALCIUM SERPL-MCNC: 9.4 MG/DL (ref 8.4–10.2)
CHLORIDE SERPL-SCNC: 104 MMOL/L (ref 98–111)
CLARITY UR: CLEAR
CO2 SERPL-SCNC: 28 MMOL/L (ref 23–31)
COLOR UR AUTO: YELLOW
CREAT SERPL-MCNC: 0.81 MG/DL (ref 0.55–1.02)
CREAT/UREA NIT SERPL: 32
EOSINOPHIL # BLD AUTO: 0.04 X10(3)/MCL (ref 0–0.9)
EOSINOPHIL NFR BLD AUTO: 1 %
ERYTHROCYTE [DISTWIDTH] IN BLOOD BY AUTOMATED COUNT: 13.2 % (ref 11.5–17)
FLUAV AG UPPER RESP QL IA.RAPID: NOT DETECTED
FLUBV AG UPPER RESP QL IA.RAPID: NOT DETECTED
GFR SERPLBLD CREATININE-BSD FMLA CKD-EPI: >60 ML/MIN/1.73/M2
GLOBULIN SER-MCNC: 3.1 GM/DL (ref 2.4–3.5)
GLUCOSE SERPL-MCNC: 125 MG/DL (ref 75–121)
GLUCOSE UR QL STRIP: NEGATIVE
HCT VFR BLD AUTO: 36.6 % (ref 37–47)
HGB BLD-MCNC: 11.9 G/DL (ref 12–16)
HGB UR QL STRIP: NEGATIVE
IMM GRANULOCYTES # BLD AUTO: 0.01 X10(3)/MCL (ref 0–0.04)
IMM GRANULOCYTES NFR BLD AUTO: 0.3 %
KETONES UR QL STRIP: NEGATIVE
LEUKOCYTE ESTERASE UR QL STRIP: NEGATIVE
LYMPHOCYTES # BLD AUTO: 1.02 X10(3)/MCL (ref 0.6–4.6)
LYMPHOCYTES NFR BLD AUTO: 26.5 %
MCH RBC QN AUTO: 30.5 PG (ref 27–31)
MCHC RBC AUTO-ENTMCNC: 32.5 G/DL (ref 33–36)
MCV RBC AUTO: 93.8 FL (ref 80–94)
MONOCYTES # BLD AUTO: 0.47 X10(3)/MCL (ref 0.1–1.3)
MONOCYTES NFR BLD AUTO: 12.2 %
NEUTROPHILS # BLD AUTO: 2.3 X10(3)/MCL (ref 2.1–9.2)
NEUTROPHILS NFR BLD AUTO: 59.7 %
NITRITE UR QL STRIP: NEGATIVE
PH UR STRIP: 6.5 [PH]
PLATELET # BLD AUTO: 222 X10(3)/MCL (ref 130–400)
PMV BLD AUTO: 10.1 FL (ref 7.4–10.4)
POTASSIUM SERPL-SCNC: 4.4 MMOL/L (ref 3.5–5.1)
PROT SERPL-MCNC: 6.3 GM/DL (ref 5.8–7.6)
PROT UR QL STRIP: NEGATIVE
RBC # BLD AUTO: 3.9 X10(6)/MCL (ref 4.2–5.4)
RSV A 5' UTR RNA NPH QL NAA+PROBE: NOT DETECTED
SARS-COV-2 RNA RESP QL NAA+PROBE: NOT DETECTED
SODIUM SERPL-SCNC: 139 MMOL/L (ref 136–145)
SP GR UR STRIP.AUTO: 1.01 (ref 1–1.03)
UROBILINOGEN UR STRIP-ACNC: 0.2
WBC # BLD AUTO: 3.85 X10(3)/MCL (ref 4.5–11.5)

## 2025-01-05 PROCEDURE — 0241U COVID/RSV/FLU A&B PCR: CPT

## 2025-01-05 PROCEDURE — 81003 URINALYSIS AUTO W/O SCOPE: CPT

## 2025-01-05 PROCEDURE — 80053 COMPREHEN METABOLIC PANEL: CPT

## 2025-01-05 PROCEDURE — 99284 EMERGENCY DEPT VISIT MOD MDM: CPT | Mod: 25

## 2025-01-05 PROCEDURE — 85025 COMPLETE CBC W/AUTO DIFF WBC: CPT

## 2025-01-05 RX ORDER — BENZONATATE 100 MG/1
100 CAPSULE ORAL 3 TIMES DAILY PRN
Qty: 30 CAPSULE | Refills: 0 | Status: SHIPPED | OUTPATIENT
Start: 2025-01-05 | End: 2025-01-15

## 2025-01-05 NOTE — ED PROVIDER NOTES
Encounter Date: 1/5/2025       History     Chief Complaint   Patient presents with    Fatigue     Pt from assisted living per [facility], pt c/o feeling weak since this am.     See mdm    The history is provided by the patient.     Review of patient's allergies indicates:   Allergen Reactions    Sulfa (sulfonamide antibiotics)      Past Medical History:   Diagnosis Date    Anxiety disorder, unspecified     Arthritis     Depression     GERD (gastroesophageal reflux disease)     Hypertension     Thyroid disease      No past surgical history on file.  No family history on file.  Social History     Tobacco Use    Smoking status: Never    Smokeless tobacco: Never   Substance Use Topics    Alcohol use: Never    Drug use: Never     Review of Systems   Respiratory:  Positive for cough.    Neurological:  Positive for weakness.   All other systems reviewed and are negative.      Physical Exam     Initial Vitals [01/05/25 1230]   BP Pulse Resp Temp SpO2   (!) 150/62 60 18 97.6 °F (36.4 °C) 98 %      MAP       --         Physical Exam    Nursing note and vitals reviewed.  Constitutional: She appears well-developed.   HENT:   Head: Normocephalic.   Right Ear: Hearing, tympanic membrane, external ear and ear canal normal.   Left Ear: Hearing, tympanic membrane, external ear and ear canal normal.   Nose: Nose normal. Right sinus exhibits no maxillary sinus tenderness and no frontal sinus tenderness. Left sinus exhibits no maxillary sinus tenderness and no frontal sinus tenderness. Mouth/Throat: Uvula is midline, oropharynx is clear and moist and mucous membranes are normal. No posterior oropharyngeal edema, posterior oropharyngeal erythema or tonsillar abscesses.   Eyes: Pupils are equal, round, and reactive to light.   Neck:   Normal range of motion.  Cardiovascular:  Normal rate.           Pulmonary/Chest: No respiratory distress.   Abdominal: Abdomen is soft. Bowel sounds are normal.   Musculoskeletal:         General: Normal range  of motion.      Cervical back: Normal range of motion.     Neurological: She is alert and oriented to person, place, and time.   Skin: Skin is warm. Capillary refill takes less than 2 seconds.   Psychiatric: She has a normal mood and affect.         ED Course   Procedures  Labs Reviewed   COMPREHENSIVE METABOLIC PANEL - Abnormal       Result Value    Sodium 139      Potassium 4.4      Chloride 104      CO2 28      Glucose 125 (*)     Blood Urea Nitrogen 26.0 (*)     Creatinine 0.81      Calcium 9.4      Protein Total 6.3      Albumin 3.2 (*)     Globulin 3.1      Albumin/Globulin Ratio 1.0 (*)     Bilirubin Total 0.2      ALP 39 (*)     ALT 15      AST 25      eGFR >60      Anion Gap 7.0      BUN/Creatinine Ratio 32     CBC WITH DIFFERENTIAL - Abnormal    WBC 3.85 (*)     RBC 3.90 (*)     Hgb 11.9 (*)     Hct 36.6 (*)     MCV 93.8      MCH 30.5      MCHC 32.5 (*)     RDW 13.2      Platelet 222      MPV 10.1      Neut % 59.7      Lymph % 26.5      Mono % 12.2      Eos % 1.0      Basophil % 0.3      Lymph # 1.02      Neut # 2.30      Mono # 0.47      Eos # 0.04      Baso # 0.01      IG# 0.01      IG% 0.3     COVID/RSV/FLU A&B PCR - Normal    Influenza A PCR Not Detected      Influenza B PCR Not Detected      Respiratory Syncytial Virus PCR Not Detected      SARS-CoV-2 PCR Not Detected      Narrative:     The Xpert Xpress SARS-CoV-2/FLU/RSV plus is a rapid, multiplexed real-time PCR test intended for the simultaneous qualitative detection and differentiation of SARS-CoV-2, Influenza A, Influenza B, and respiratory syncytial virus (RSV) viral RNA in either nasopharyngeal swab or nasal swab specimens.         URINALYSIS, REFLEX TO URINE CULTURE - Normal    Color, UA Yellow      Appearance, UA Clear      Specific Gravity, UA 1.015      pH, UA 6.5      Protein, UA Negative      Glucose, UA Negative      Ketones, UA Negative      Blood, UA Negative      Bilirubin, UA Negative      Urobilinogen, UA 0.2      Nitrites, UA  Negative      Leukocyte Esterase, UA Negative     CBC W/ AUTO DIFFERENTIAL    Narrative:     The following orders were created for panel order CBC auto differential.  Procedure                               Abnormality         Status                     ---------                               -----------         ------                     CBC with Differential[[id number]]       Abnormal            Final result                 Please view results for these tests on the individual orders.          Imaging Results              X-Ray Chest PA And Lateral (Final result)  Result time 01/05/25 13:42:11      Final result by Tien Wallis MD (01/05/25 13:42:11)                   Impression:      No focal consolidation or evidence of acute cardiac decompensation.      Electronically signed by: Tien Wallis  Date:    01/05/2025  Time:    13:42               Narrative:    EXAMINATION:  XR CHEST PA AND LATERAL    CLINICAL HISTORY:  Cough, unspecified    TECHNIQUE:  PA and lateral views of the chest were performed.    COMPARISON:  Radiograph 09/09/2023; CTA 09/09/2023    FINDINGS:  The cardiomediastinal silhouette and pulmonary vasculature are within normal limits.  Mild aortic vascular calcifications.  No focal consolidation, pneumothorax or pleural effusion.  No acute osseous abnormality identified.                                       Medications - No data to display  Medical Decision Making  89 year old female presents to the ED per ems c/o cough and weakness that started on today. The patient states that symptoms started on this morning. The patient denies and sputum production or fevers at home. The patient does admit to having hx of COPD but denies any daily use of inhalers. The patient denies any worsening of sob, wheezing, or difficulty breathing. The patient does admit to living at assisted living. The patient does admit to walking on own w/o any use of assistive devices.    Amount and/or Complexity of Data  Reviewed  Labs: ordered.  Radiology: ordered.      Additional MDM:   Differential Diagnosis:   Other: The following diagnoses were also considered and will be evaluated: pneumonia, acute uti and covid 19.            ED Course as of 01/05/25 1534   Sun Jan 05, 2025   1531 Patient evaluated in ED. Covid, flu,rsv, negative.Chest x-ray negative. Urine negative. Blood labs stable. Will treat patient for uri. Will d/c back to assisted living with tessalon. Patient educated to please take otc tylenol for ay fevers. Please return to ED with any worsening of symptoms. Please f/u with pcp in 1 week [DL]      ED Course User Index  [DL] Indio Floyd NP                           Clinical Impression:  Final diagnoses:  [R05.9] Cough  [J06.9] Upper respiratory tract infection, unspecified type (Primary)          ED Disposition Condition    Discharge Stable          ED Prescriptions       Medication Sig Dispense Start Date End Date Auth. Provider    benzonatate (TESSALON) 100 MG capsule Take 1 capsule (100 mg total) by mouth 3 (three) times daily as needed. 30 capsule 1/5/2025 1/15/2025 Indio Floyd NP          Follow-up Information       Follow up With Specialties Details Why Contact Info    Virgil Mcknight MD Internal Medicine In 3 days  [address]  [address]  [place], [facility] [address]  [phone]               Indio Floyd NP  02/02/25 5988

## 2025-07-16 ENCOUNTER — HOSPITAL ENCOUNTER (OUTPATIENT)
Dept: RADIOLOGY | Facility: HOSPITAL | Age: OVER 89
Discharge: HOME | End: 2025-07-16
Attending: INTERNAL MEDICINE
Payer: MEDICARE

## 2025-07-16 DIAGNOSIS — R55 SYNCOPE AND COLLAPSE: Primary | ICD-10-CM

## 2025-07-16 DIAGNOSIS — R55 SYNCOPE AND COLLAPSE: ICD-10-CM

## 2025-07-16 PROCEDURE — 70450 CT HEAD/BRAIN W/O DYE: CPT | Mod: TC

## 2025-08-03 ENCOUNTER — HOSPITAL ENCOUNTER (EMERGENCY)
Facility: HOSPITAL | Age: OVER 89
Discharge: HOME | End: 2025-08-03
Attending: INTERNAL MEDICINE
Payer: MEDICARE

## 2025-08-03 VITALS
HEIGHT: 61 IN | OXYGEN SATURATION: 98 % | WEIGHT: 130 LBS | BODY MASS INDEX: 24.55 KG/M2 | HEART RATE: 79 BPM | DIASTOLIC BLOOD PRESSURE: 89 MMHG | SYSTOLIC BLOOD PRESSURE: 189 MMHG | RESPIRATION RATE: 16 BRPM | TEMPERATURE: 98 F

## 2025-08-03 DIAGNOSIS — R42 VERTIGO: ICD-10-CM

## 2025-08-03 DIAGNOSIS — I10 HYPERTENSION, UNSPECIFIED TYPE: ICD-10-CM

## 2025-08-03 DIAGNOSIS — E86.0 DEHYDRATION: ICD-10-CM

## 2025-08-03 DIAGNOSIS — E16.2 HYPOGLYCEMIA: ICD-10-CM

## 2025-08-03 DIAGNOSIS — R07.9 CHEST PAIN: Primary | ICD-10-CM

## 2025-08-03 LAB
ALBUMIN SERPL-MCNC: 3.4 G/DL (ref 3.4–4.8)
ALBUMIN/GLOB SERPL: 0.9 RATIO (ref 1.1–2)
ALP SERPL-CCNC: 67 UNIT/L (ref 40–150)
ALT SERPL-CCNC: 17 UNIT/L (ref 0–55)
ANION GAP SERPL CALC-SCNC: 11 MEQ/L
AST SERPL-CCNC: 28 UNIT/L (ref 11–45)
BASOPHILS # BLD AUTO: 0.02 X10(3)/MCL
BASOPHILS NFR BLD AUTO: 0.5 %
BILIRUB SERPL-MCNC: 0.2 MG/DL
BILIRUB UR QL STRIP.AUTO: NEGATIVE
BUN SERPL-MCNC: 23 MG/DL (ref 9.8–20.1)
CALCIUM SERPL-MCNC: 9.9 MG/DL (ref 8.4–10.2)
CHLORIDE SERPL-SCNC: 105 MMOL/L (ref 98–111)
CLARITY UR: CLEAR
CO2 SERPL-SCNC: 24 MMOL/L (ref 23–31)
COLOR UR AUTO: YELLOW
CREAT SERPL-MCNC: 0.72 MG/DL (ref 0.55–1.02)
CREAT/UREA NIT SERPL: 32
EOSINOPHIL # BLD AUTO: 0.14 X10(3)/MCL (ref 0–0.9)
EOSINOPHIL NFR BLD AUTO: 3.8 %
ERYTHROCYTE [DISTWIDTH] IN BLOOD BY AUTOMATED COUNT: 12.8 % (ref 11.5–17)
GFR SERPLBLD CREATININE-BSD FMLA CKD-EPI: >60 ML/MIN/1.73/M2
GLOBULIN SER-MCNC: 4 GM/DL (ref 2.4–3.5)
GLUCOSE SERPL-MCNC: 60 MG/DL (ref 75–121)
GLUCOSE UR QL STRIP: NEGATIVE
HCT VFR BLD AUTO: 40.5 % (ref 37–47)
HGB BLD-MCNC: 13.3 G/DL (ref 12–16)
HGB UR QL STRIP: NEGATIVE
IMM GRANULOCYTES # BLD AUTO: 0.01 X10(3)/MCL (ref 0–0.04)
IMM GRANULOCYTES NFR BLD AUTO: 0.3 %
KETONES UR QL STRIP: NEGATIVE
LEUKOCYTE ESTERASE UR QL STRIP: NEGATIVE
LYMPHOCYTES # BLD AUTO: 1.17 X10(3)/MCL (ref 0.6–4.6)
LYMPHOCYTES NFR BLD AUTO: 31.6 %
MCH RBC QN AUTO: 31.4 PG (ref 27–31)
MCHC RBC AUTO-ENTMCNC: 32.8 G/DL (ref 33–36)
MCV RBC AUTO: 95.7 FL (ref 80–94)
MONOCYTES # BLD AUTO: 0.52 X10(3)/MCL (ref 0.1–1.3)
MONOCYTES NFR BLD AUTO: 14.1 %
NEUTROPHILS # BLD AUTO: 1.84 X10(3)/MCL (ref 2.1–9.2)
NEUTROPHILS NFR BLD AUTO: 49.7 %
NITRITE UR QL STRIP: NEGATIVE
NRBC BLD AUTO-RTO: 0 %
NT-PROBNP SERPL-MCNC: 409 PG/ML
PH UR STRIP: 6.5 [PH]
PLATELET # BLD AUTO: 260 X10(3)/MCL (ref 130–400)
PMV BLD AUTO: 10.2 FL (ref 7.4–10.4)
POTASSIUM SERPL-SCNC: 3.7 MMOL/L (ref 3.5–5.1)
PROT SERPL-MCNC: 7.4 GM/DL (ref 5.8–7.6)
PROT UR QL STRIP: NEGATIVE
RBC # BLD AUTO: 4.23 X10(6)/MCL (ref 4.2–5.4)
SODIUM SERPL-SCNC: 140 MMOL/L (ref 136–145)
SP GR UR STRIP.AUTO: 1.01 (ref 1–1.03)
TROPONIN I SERPL HS-MCNC: 4 NG/L
TROPONIN I SERPL HS-MCNC: 4 NG/L
UROBILINOGEN UR STRIP-ACNC: 0.2
WBC # BLD AUTO: 3.7 X10(3)/MCL (ref 4.5–11.5)

## 2025-08-03 PROCEDURE — 84484 ASSAY OF TROPONIN QUANT: CPT | Performed by: INTERNAL MEDICINE

## 2025-08-03 PROCEDURE — 25000003 PHARM REV CODE 250: Performed by: INTERNAL MEDICINE

## 2025-08-03 PROCEDURE — 81003 URINALYSIS AUTO W/O SCOPE: CPT | Performed by: INTERNAL MEDICINE

## 2025-08-03 PROCEDURE — 93010 ELECTROCARDIOGRAM REPORT: CPT | Mod: ,,, | Performed by: STUDENT IN AN ORGANIZED HEALTH CARE EDUCATION/TRAINING PROGRAM

## 2025-08-03 PROCEDURE — 83880 ASSAY OF NATRIURETIC PEPTIDE: CPT | Performed by: INTERNAL MEDICINE

## 2025-08-03 PROCEDURE — 85025 COMPLETE CBC W/AUTO DIFF WBC: CPT | Performed by: INTERNAL MEDICINE

## 2025-08-03 PROCEDURE — 82962 GLUCOSE BLOOD TEST: CPT

## 2025-08-03 PROCEDURE — 80053 COMPREHEN METABOLIC PANEL: CPT | Performed by: INTERNAL MEDICINE

## 2025-08-03 PROCEDURE — 96360 HYDRATION IV INFUSION INIT: CPT

## 2025-08-03 PROCEDURE — 99285 EMERGENCY DEPT VISIT HI MDM: CPT | Mod: 25

## 2025-08-03 PROCEDURE — 93005 ELECTROCARDIOGRAM TRACING: CPT

## 2025-08-03 PROCEDURE — S5010 5% DEXTROSE AND 0.45% SALINE: HCPCS | Performed by: INTERNAL MEDICINE

## 2025-08-03 RX ORDER — AMLODIPINE BESYLATE 5 MG/1
5 TABLET ORAL
Status: COMPLETED | OUTPATIENT
Start: 2025-08-03 | End: 2025-08-03

## 2025-08-03 RX ORDER — DEXTROSE MONOHYDRATE AND SODIUM CHLORIDE 5; .45 G/100ML; G/100ML
1000 INJECTION, SOLUTION INTRAVENOUS
Status: COMPLETED | OUTPATIENT
Start: 2025-08-03 | End: 2025-08-03

## 2025-08-03 RX ORDER — HYDROXYZINE PAMOATE 25 MG/1
25 CAPSULE ORAL
Status: COMPLETED | OUTPATIENT
Start: 2025-08-03 | End: 2025-08-03

## 2025-08-03 RX ADMIN — HYDROXYZINE PAMOATE 25 MG: 25 CAPSULE ORAL at 04:08

## 2025-08-03 RX ADMIN — DEXTROSE AND SODIUM CHLORIDE 1000 ML: 5; 450 INJECTION, SOLUTION INTRAVENOUS at 03:08

## 2025-08-03 RX ADMIN — AMLODIPINE BESYLATE 5 MG: 5 TABLET ORAL at 05:08

## 2025-08-03 NOTE — DISCHARGE INSTRUCTIONS
Patient must be seen by patient's primary care M.D. for follow-up and further treatment as needed.     Inform patients Primary Care physician about the ER visit and need for follow up.    Get medicines and orders from the emergency room approved by patient's primary care M.D.    The exam and treatment you received in Emergency Room was for an urgent problem and NOT INTENDED AS COMPLETE CARE. It is important that you FOLLOW UP with a doctor for ongoing care. If your symptoms become WORSE or you DO NOT IMPROVE and you are unable to reach your health care provider, you should RETURN to the emergency department. The Emergency Room doctor has provided a PRELIMINARY INTERPRETATION of all your STUDIES. A final interpretation may be done after you are discharged. IF A CHANGE in your diagnosis or treatment is needed WE WILL CONTACT YOU. It is critical that we have a CURRENT PHONE NUMBER FOR YOU.

## 2025-08-03 NOTE — ED PROVIDER NOTES
Encounter Date: 8/3/2025  History from patient and daughter     History     Chief Complaint   Patient presents with    Fatigue     Arrived by EMS from [facility] assisted living with c/o weakness that started today. Also reports dizziness when standing up.      HPI    Minnie Barcenas is 89 y.o. female who  has a past medical history of Anxiety disorder, unspecified, Arthritis, Depression, GERD (gastroesophageal reflux disease), Hypertension, and Thyroid disease. arrives in ER with c/o Fatigue (Arrived by EMS from [facility] assisted living with c/o weakness that started today. Also reports dizziness when standing up. )    Review of patient's allergies indicates:   Allergen Reactions    Sulfa (sulfonamide antibiotics)      Past Medical History:   Diagnosis Date    Anxiety disorder, unspecified     Arthritis     Depression     GERD (gastroesophageal reflux disease)     Hypertension     Thyroid disease      History reviewed. No pertinent surgical history.  No family history on file.  Social History[1]  Review of Systems   Constitutional:  Negative for fever.   HENT:  Negative for trouble swallowing and voice change.    Eyes:  Negative for visual disturbance.   Respiratory:  Negative for cough and shortness of breath.    Cardiovascular:  Negative for chest pain.   Gastrointestinal:  Negative for abdominal pain, diarrhea and vomiting.   Genitourinary:  Negative for dysuria and hematuria.   Musculoskeletal:  Negative for back pain and gait problem.   Skin:  Negative for color change and rash.   Neurological:  Positive for dizziness. Negative for headaches.   Psychiatric/Behavioral:  Negative for behavioral problems and sleep disturbance.    All other systems reviewed and are negative.      Physical Exam     Initial Vitals [08/03/25 1356]   BP Pulse Resp Temp SpO2   (!) 197/86 62 18 96 °F (35.6 °C) 100 %      MAP       --         Physical Exam    Nursing note and vitals reviewed.  Constitutional: She appears  well-developed.   HENT:   Head: Atraumatic.   Eyes: EOM are normal.   Neck: Neck supple.   Cardiovascular:  Normal rate and regular rhythm.           Pulmonary/Chest: Breath sounds normal.   Abdominal: Abdomen is soft. Bowel sounds are normal. She exhibits no distension. There is no abdominal tenderness. There is no rebound and no guarding.   Musculoskeletal:         General: No edema. Normal range of motion.      Cervical back: Neck supple.     Neurological: She is alert and oriented to person, place, and time. GCS score is 15. GCS eye subscore is 4. GCS verbal subscore is 5. GCS motor subscore is 6.   Skin: Skin is warm and dry.   Psychiatric: She has a normal mood and affect.         ED Course   Procedures  Orders Placed This Encounter    X-Ray Chest AP Portable    CT Head Without Contrast    CBC auto differential    Comprehensive metabolic panel    Troponin I High Sensitivity #1    Troponin I High Sensitivity #2    NT-Pro Natriuretic Peptide    CBC with Differential    Urinalysis, Reflex to Urine Culture Urine, Clean Catch    Diet NPO    Vital signs    Cardiac Monitoring - Adult    Orthostatic blood pressure    Pulse Oximetry Continuous    EKG 12-lead    Saline lock IV    dextrose 5 % and 0.45 % NaCl infusion    hydrOXYzine pamoate capsule 25 mg    amLODIPine tablet 5 mg       Labs Reviewed   COMPREHENSIVE METABOLIC PANEL - Abnormal       Result Value    Sodium 140      Potassium 3.7      Chloride 105      CO2 24      Glucose 60 (*)     Blood Urea Nitrogen 23.0 (*)     Creatinine 0.72      Calcium 9.9      Protein Total 7.4      Albumin 3.4      Globulin 4.0 (*)     Albumin/Globulin Ratio 0.9 (*)     Bilirubin Total 0.2      ALP 67      ALT 17      AST 28      eGFR >60      Anion Gap 11.0      BUN/Creatinine Ratio 32     NT-PRO NATRIURETIC PEPTIDE - Abnormal    NT-proBNP 409 (*)     Narrative:     NOTE:  Access complete set of age - and/or gender-specific reference intervals for this test in the [facility]  [facility]atory Collection Manual.   CBC WITH DIFFERENTIAL - Abnormal    WBC 3.70 (*)     RBC 4.23      Hgb 13.3      Hct 40.5      MCV 95.7 (*)     MCH 31.4 (*)     MCHC 32.8 (*)     RDW 12.8      Platelet 260      MPV 10.2      Neut % 49.7      Lymph % 31.6      Mono % 14.1      Eos % 3.8      Basophil % 0.5      Imm Grans % 0.3      Neut # 1.84 (*)     Lymph # 1.17      Mono # 0.52      Eos # 0.14      Baso # 0.02      Imm Gran # 0.01      NRBC% 0.0     TROPONIN I HIGH SENSITIVITY - Normal    Troponin High Sensitive 4     URINALYSIS, REFLEX TO URINE CULTURE - Normal    Color, UA Yellow      Appearance, UA Clear      Specific Gravity, UA 1.015      pH, UA 6.5      Protein, UA Negative      Glucose, UA Negative      Ketones, UA Negative      Blood, UA Negative      Bilirubin, UA Negative      Urobilinogen, UA 0.2      Nitrites, UA Negative      Leukocyte Esterase, UA Negative     CBC W/ AUTO DIFFERENTIAL    Narrative:     The following orders were created for panel order CBC auto differential.  Procedure                               Abnormality         Status                     ---------                               -----------         ------                     CBC with Differential[[id number]]       Abnormal            Final result                 Please view results for these tests on the individual orders.   TROPONIN I HIGH SENSITIVITY        ECG Results              EKG 12-lead (Preliminary result)  Result time 08/03/25 16:02:47      Wet Read by Coreen Mendiola MD (08/03/25 16:02:47, [facility] - Emergency Dept, Emergency Medicine)    EKG: Independently reviewed and / or Interpreted by Coreen Mendiola MD. independently as Normal Sinus Rhythm, Rate 65, Normal Axis, RBBB, No STEMI, no ischemic changes.                                    Imaging Results              CT Head Without Contrast (Final result)  Result time 08/03/25 16:05:05      Final result by Lonny Méndez MD (08/03/25 16:05:05)                    Impression:      1. No evidence of acute intracranial hemorrhage or large territory infarct.  2. Mild, diffuse atrophy.  3. /moderate chronic small vessel ischemic changes, similar since 07/16/2025.      Electronically signed by: Lonny Méndez MD  Date:    08/03/2025  Time:    16:05               Narrative:    EXAMINATION:  CT HEAD WITHOUT CONTRAST    INDICATION:  Dizziness, persistent/recurrent, cardiac or vascular cause suspected;    TECHNIQUE:  Contiguous axial images are acquired through the head without IV contrast.  These images were reconstructed into the coronal and sagittal plane.  Automated exposure control, dose radiation lowering technique was utilized.    COMPARISON:  Head CT from 07/16/2025    Head CT from 10/07/2024    FINDINGS:  Partially visualized paranasal sinuses and mastoid air cells are clear.  Intact calvarium.  There is mild, diffuse atrophy with compensatory ventricular enlargement, similar compared to the prior.    No extra-axial fluid collection, contusion or hemorrhage.  There are patchy areas of decreased density within the supratentorial white matter.  No interval change since 07/16/2025.  No hyperdense vessel sign.  No evidence of large territory infarct.                                       X-Ray Chest AP Portable (Final result)  Result time 08/03/25 15:25:27      Final result by Quintin Boyd Jr., MD (08/03/25 15:25:27)                   Impression:      1.   No acute pulmonary infiltrate or lobar type consolidation.      Electronically signed by: Quintin Boyd MD  Date:    08/03/2025  Time:    15:25               Narrative:    EXAMINATION:  XR CHEST AP PORTABLE    CLINICAL HISTORY:  Chest Pain;    TECHNIQUE:  Single frontal view of the chest was performed.    COMPARISON:  January 5, 2025    FINDINGS:  The heart size is normal.  The thoracic aorta is tortuous and uncoiled.  The lungs are expanded with increased interstitial markings present  bilaterally.  There is pleural thickening about the right lung apex.  There is volume loss and atelectasis at the left lung base.  No pneumothorax.                                       Medications   dextrose 5 % and 0.45 % NaCl infusion (0 mLs Intravenous Stopped 8/3/25 1623)   hydrOXYzine pamoate capsule 25 mg (25 mg Oral Given 8/3/25 1656)   amLODIPine tablet 5 mg (5 mg Oral Given 8/3/25 1732)     Medical Decision Making    Minnie Barcenas is 89 y.o. female who  has a past medical history of Anxiety disorder, unspecified, Arthritis, Depression, GERD (gastroesophageal reflux disease), Hypertension, and Thyroid disease. arrives in ER with c/o Fatigue (Arrived by EMS from [facility] with c/o weakness that started today. Also reports dizziness when standing up. )    Patient is brought to the emergency room from [facility] for evaluation for generalized weakness and dizziness which started today.  Patient says that she just feels dizzy and gets weak when she stands up, she does not know a lot of history, and she gets frustrated when you ask too many questions, patient's daughter says that apparently this has been going on for months to a year or so, they brought her to her family doctor's office they did not give her any diagnosis, but when she complains of this in the nursing home they give her dramamine, and has been complaining of this for some time and nobody seems to know why she is having these symptoms.    Today again they sent her from the nursing home.  And patient's daughter says that usually she gets dizzy on Sunday, and even she fell a couple of son days back when she had some hematoma of the back of the head, and they did the CT scan and that is told me that her brain is small because of the aging.  They have never been able to find anything why she is having all these symptoms.    Patient's blood pressure is elevated, it does come down a little bit when she sits up, but really she  can not stand up.         Amount and/or Complexity of Data Reviewed  Labs: ordered.  Radiology: ordered.    Risk  Prescription drug management.               ED Course as of 08/03/25 1749   Sun Aug 03, 2025   1557 Dr. Lynne came to the emergency room, I discussed the case with him in detail, and he feels that this has been going on for some time, we can always give her a little IV fluids because she has been having that BUN creatinine range in the past also, and she can go back to the nursing home and follow up with Dr. Mcknight. [GQ]   1558 BP(!): 158/84 [GQ]   1558 Pulse: 70 [GQ]   1558 SpO2: 100 % [GQ]   1558 Patient's blood sugar is 60, I am going to give her D5 half  mL bolus and let her go back to the nursing home.  With instruction to continue giving her Dramamine as needed and see Dr. Mcknight for re-evaluation and follow up. [GQ]   1747 Patient's blood sugar has come up, she is more anxious, I gave her hydroxyzine to help her with the anxiety and vertigo, she is already on meclizine, I will also gave her a dose of amlodipine, and I am going to let her go back home with instruction to see Dr. Mcknight for follow up as outpatient for further instruction when treatment as needed. [GQ]      ED Course User Index  [GQ] Coreen Mendiola MD                               Clinical Impression:  Final diagnoses:  [R07.9] Chest pain (Primary)  [E86.0] Dehydration  [E16.2] Hypoglycemia  [R42] Vertigo  [I10] Hypertension, unspecified type          ED Disposition Condition    Discharge Stable          ED Prescriptions    None       Follow-up Information       Follow up With Specialties Details Why Contact Info    Virgil Mcknight MD Internal Medicine In 1 day  [facility]  [place]  [facility] [address] [phone]                     [1]   Social History  Tobacco Use    Smoking status: Never    Smokeless tobacco: Never   Substance Use Topics    Alcohol use: Never    Drug use: Never         Coreen Mendiola MD  08/03/25 3551

## 2025-08-04 LAB
OHS QRS DURATION: 142 MS
OHS QTC CALCULATION: 468 MS
POCT GLUCOSE: 154 MG/DL (ref 70–110)